# Patient Record
Sex: FEMALE | Race: WHITE
[De-identification: names, ages, dates, MRNs, and addresses within clinical notes are randomized per-mention and may not be internally consistent; named-entity substitution may affect disease eponyms.]

---

## 2017-05-01 ENCOUNTER — HOSPITAL ENCOUNTER (OUTPATIENT)
Dept: HOSPITAL 79 - ER1 | Age: 52
Setting detail: OBSERVATION
LOS: 2 days | Discharge: HOME | End: 2017-05-03
Attending: INTERNAL MEDICINE | Admitting: EMERGENCY MEDICINE
Payer: COMMERCIAL

## 2017-05-01 VITALS — BODY MASS INDEX: 31.7 KG/M2 | WEIGHT: 197.25 LBS | HEIGHT: 66 IN

## 2017-05-01 DIAGNOSIS — Z90.49: ICD-10-CM

## 2017-05-01 DIAGNOSIS — Z79.899: ICD-10-CM

## 2017-05-01 DIAGNOSIS — I49.5: ICD-10-CM

## 2017-05-01 DIAGNOSIS — J44.1: ICD-10-CM

## 2017-05-01 DIAGNOSIS — Z99.81: ICD-10-CM

## 2017-05-01 DIAGNOSIS — I48.0: ICD-10-CM

## 2017-05-01 DIAGNOSIS — Z88.0: ICD-10-CM

## 2017-05-01 DIAGNOSIS — Z95.0: ICD-10-CM

## 2017-05-01 DIAGNOSIS — R09.02: ICD-10-CM

## 2017-05-01 DIAGNOSIS — Z98.51: ICD-10-CM

## 2017-05-01 DIAGNOSIS — G40.909: Primary | ICD-10-CM

## 2017-05-01 DIAGNOSIS — I10: ICD-10-CM

## 2017-05-01 DIAGNOSIS — G47.33: ICD-10-CM

## 2017-05-01 DIAGNOSIS — F41.9: ICD-10-CM

## 2017-05-01 DIAGNOSIS — Z79.82: ICD-10-CM

## 2017-05-01 LAB
BUN/CREATININE RATIO: 12 (ref 0–10)
HGB BLD-MCNC: 11.4 GM/DL (ref 12.3–15.3)
RED BLOOD COUNT: 4.38 M/UL (ref 4–5.1)
WHITE BLOOD COUNT: 9.7 K/UL (ref 4.5–11)

## 2017-05-01 PROCEDURE — G0378 HOSPITAL OBSERVATION PER HR: HCPCS

## 2020-05-22 ENCOUNTER — OUTSIDE FACILITY SERVICE (OUTPATIENT)
Dept: CARDIOLOGY | Facility: CLINIC | Age: 55
End: 2020-05-22

## 2020-05-22 PROCEDURE — 93018 CV STRESS TEST I&R ONLY: CPT | Performed by: INTERNAL MEDICINE

## 2020-05-22 PROCEDURE — 78452 HT MUSCLE IMAGE SPECT MULT: CPT | Performed by: INTERNAL MEDICINE

## 2021-09-15 ENCOUNTER — HOSPITAL ENCOUNTER (EMERGENCY)
Facility: HOSPITAL | Age: 56
Discharge: HOME | End: 2021-09-15
Attending: STUDENT IN AN ORGANIZED HEALTH CARE EDUCATION/TRAINING PROGRAM | Admitting: STUDENT IN AN ORGANIZED HEALTH CARE EDUCATION/TRAINING PROGRAM

## 2021-09-15 VITALS
HEART RATE: 75 BPM | OXYGEN SATURATION: 98 % | RESPIRATION RATE: 16 BRPM | BODY MASS INDEX: 37.22 KG/M2 | SYSTOLIC BLOOD PRESSURE: 148 MMHG | TEMPERATURE: 98.7 F | HEIGHT: 64 IN | WEIGHT: 218 LBS | DIASTOLIC BLOOD PRESSURE: 76 MMHG

## 2021-09-15 DIAGNOSIS — F41.9 ANXIETY: Primary | ICD-10-CM

## 2021-09-15 LAB
ALBUMIN SERPL-MCNC: 4.38 G/DL (ref 3.5–5.2)
ALBUMIN/GLOB SERPL: 1.6 G/DL
ALP SERPL-CCNC: 126 U/L (ref 39–117)
ALT SERPL W P-5'-P-CCNC: 16 U/L (ref 1–33)
AMPHET+METHAMPHET UR QL: NEGATIVE
AMPHETAMINES UR QL: NEGATIVE
ANION GAP SERPL CALCULATED.3IONS-SCNC: 10.4 MMOL/L (ref 5–15)
AST SERPL-CCNC: 16 U/L (ref 1–32)
B-HCG UR QL: NEGATIVE
BACTERIA UR QL AUTO: ABNORMAL /HPF
BARBITURATES UR QL SCN: NEGATIVE
BASOPHILS # BLD AUTO: 0.02 10*3/MM3 (ref 0–0.2)
BASOPHILS NFR BLD AUTO: 0.2 % (ref 0–1.5)
BENZODIAZ UR QL SCN: NEGATIVE
BILIRUB SERPL-MCNC: 1.2 MG/DL (ref 0–1.2)
BILIRUB UR QL STRIP: ABNORMAL
BUN SERPL-MCNC: 12 MG/DL (ref 6–20)
BUN/CREAT SERPL: 15 (ref 7–25)
BUPRENORPHINE SERPL-MCNC: NEGATIVE NG/ML
CALCIUM SPEC-SCNC: 9.3 MG/DL (ref 8.6–10.5)
CANNABINOIDS SERPL QL: NEGATIVE
CHLORIDE SERPL-SCNC: 106 MMOL/L (ref 98–107)
CLARITY UR: ABNORMAL
CO2 SERPL-SCNC: 25.6 MMOL/L (ref 22–29)
COCAINE UR QL: NEGATIVE
COD CRY URNS QL: ABNORMAL /HPF
COLOR UR: ABNORMAL
CREAT SERPL-MCNC: 0.8 MG/DL (ref 0.57–1)
DEPRECATED RDW RBC AUTO: 38.1 FL (ref 37–54)
EOSINOPHIL # BLD AUTO: 0.09 10*3/MM3 (ref 0–0.4)
EOSINOPHIL NFR BLD AUTO: 1.1 % (ref 0.3–6.2)
ERYTHROCYTE [DISTWIDTH] IN BLOOD BY AUTOMATED COUNT: 12.7 % (ref 12.3–15.4)
ETHANOL BLD-MCNC: <10 MG/DL (ref 0–10)
ETHANOL UR QL: <0.01 %
FLUAV SUBTYP SPEC NAA+PROBE: NOT DETECTED
FLUBV RNA ISLT QL NAA+PROBE: NOT DETECTED
GFR SERPL CREATININE-BSD FRML MDRD: 74 ML/MIN/1.73
GLOBULIN UR ELPH-MCNC: 2.7 GM/DL
GLUCOSE SERPL-MCNC: 112 MG/DL (ref 65–99)
GLUCOSE UR STRIP-MCNC: NEGATIVE MG/DL
HCT VFR BLD AUTO: 41.1 % (ref 34–46.6)
HGB BLD-MCNC: 12.9 G/DL (ref 12–15.9)
HGB UR QL STRIP.AUTO: ABNORMAL
HYALINE CASTS UR QL AUTO: ABNORMAL /LPF
IMM GRANULOCYTES # BLD AUTO: 0.03 10*3/MM3 (ref 0–0.05)
IMM GRANULOCYTES NFR BLD AUTO: 0.4 % (ref 0–0.5)
KETONES UR QL STRIP: ABNORMAL
LEUKOCYTE ESTERASE UR QL STRIP.AUTO: ABNORMAL
LYMPHOCYTES # BLD AUTO: 1.5 10*3/MM3 (ref 0.7–3.1)
LYMPHOCYTES NFR BLD AUTO: 18.6 % (ref 19.6–45.3)
MAGNESIUM SERPL-MCNC: 2.3 MG/DL (ref 1.6–2.6)
MCH RBC QN AUTO: 26.1 PG (ref 26.6–33)
MCHC RBC AUTO-ENTMCNC: 31.4 G/DL (ref 31.5–35.7)
MCV RBC AUTO: 83.2 FL (ref 79–97)
METHADONE UR QL SCN: NEGATIVE
MONOCYTES # BLD AUTO: 0.47 10*3/MM3 (ref 0.1–0.9)
MONOCYTES NFR BLD AUTO: 5.8 % (ref 5–12)
NEUTROPHILS NFR BLD AUTO: 5.97 10*3/MM3 (ref 1.7–7)
NEUTROPHILS NFR BLD AUTO: 73.9 % (ref 42.7–76)
NITRITE UR QL STRIP: NEGATIVE
NRBC BLD AUTO-RTO: 0 /100 WBC (ref 0–0.2)
OPIATES UR QL: NEGATIVE
OXYCODONE UR QL SCN: NEGATIVE
PCP UR QL SCN: NEGATIVE
PH UR STRIP.AUTO: <=5 [PH] (ref 5–8)
PLATELET # BLD AUTO: 337 10*3/MM3 (ref 140–450)
PMV BLD AUTO: 10.4 FL (ref 6–12)
POTASSIUM SERPL-SCNC: 3.7 MMOL/L (ref 3.5–5.2)
PROPOXYPH UR QL: NEGATIVE
PROT SERPL-MCNC: 7.1 G/DL (ref 6–8.5)
PROT UR QL STRIP: ABNORMAL
RBC # BLD AUTO: 4.94 10*6/MM3 (ref 3.77–5.28)
RBC # UR: ABNORMAL /HPF
REF LAB TEST METHOD: ABNORMAL
SARS-COV-2 RNA PNL SPEC NAA+PROBE: NOT DETECTED
SODIUM SERPL-SCNC: 142 MMOL/L (ref 136–145)
SP GR UR STRIP: 1.03 (ref 1–1.03)
SQUAMOUS #/AREA URNS HPF: ABNORMAL /HPF
TRANS CELLS #/AREA URNS HPF: ABNORMAL /HPF
TRICYCLICS UR QL SCN: NEGATIVE
UROBILINOGEN UR QL STRIP: ABNORMAL
WBC # BLD AUTO: 8.08 10*3/MM3 (ref 3.4–10.8)
WBC UR QL AUTO: ABNORMAL /HPF

## 2021-09-15 PROCEDURE — 99285 EMERGENCY DEPT VISIT HI MDM: CPT

## 2021-09-15 PROCEDURE — 80306 DRUG TEST PRSMV INSTRMNT: CPT | Performed by: PHYSICIAN ASSISTANT

## 2021-09-15 PROCEDURE — 81001 URINALYSIS AUTO W/SCOPE: CPT | Performed by: PHYSICIAN ASSISTANT

## 2021-09-15 PROCEDURE — 80053 COMPREHEN METABOLIC PANEL: CPT | Performed by: PHYSICIAN ASSISTANT

## 2021-09-15 PROCEDURE — 87636 SARSCOV2 & INF A&B AMP PRB: CPT | Performed by: PHYSICIAN ASSISTANT

## 2021-09-15 PROCEDURE — C9803 HOPD COVID-19 SPEC COLLECT: HCPCS

## 2021-09-15 PROCEDURE — 82077 ASSAY SPEC XCP UR&BREATH IA: CPT | Performed by: PHYSICIAN ASSISTANT

## 2021-09-15 PROCEDURE — 81025 URINE PREGNANCY TEST: CPT | Performed by: PHYSICIAN ASSISTANT

## 2021-09-15 PROCEDURE — 83735 ASSAY OF MAGNESIUM: CPT | Performed by: PHYSICIAN ASSISTANT

## 2021-09-15 PROCEDURE — 85025 COMPLETE CBC W/AUTO DIFF WBC: CPT | Performed by: PHYSICIAN ASSISTANT

## 2021-09-15 RX ORDER — LORAZEPAM 1 MG/1
1 TABLET ORAL ONCE
Status: COMPLETED | OUTPATIENT
Start: 2021-09-15 | End: 2021-09-15

## 2021-09-15 RX ADMIN — LORAZEPAM 1 MG: 1 TABLET ORAL at 08:48

## 2021-09-15 NOTE — NURSING NOTE
Called and provided all intake information including  abnormal lab and medical information to  discharge orders received [id number].Patient and ED provider notified   Toxicology

## 2021-09-15 NOTE — DISCHARGE INSTRUCTIONS
Advised patient to follow up with PCP and psychiatrist as soon as possible. I advised patient to return to the ER if worsening or new symptoms present. Patient verbalized understanding of follow up.

## 2021-09-15 NOTE — NURSING NOTE
Patient presented to ED via friend on the behest of her therapist. Patient complains of feelings of overwhelming sadness and feelings of being on the edge. She reported she was caring her grand child for about 11 months while the mother went to work however recently the mother has sought other arrangements and this is upsetting to the patient.Patient denies SI, HI,drugs and AVH she stated she goes out occassionally with friends to a local bar and will have a few drinks. She denies any dependency related problems.She rated anxiety 10 (Through the roof) and depression 6/10.She reports poor sleep and appetite. Patient was Hydroxyzine 50 mg  Daily for sleep Mirtazapine 7.5 daily for depression .

## 2021-09-15 NOTE — ED PROVIDER NOTES
Subjective   Assumed patient from ALFONZO Fischer at shift change. Patient is a 56 year old female presenting to the ER c/o depression and anxiety that she has chronically but for the past few days she's reported increased stress from different family members has increased her anxiety and stress. Patient sees a therapist regularly and hasn't missed any appointments with her. Patient denies suicidal or homicidal ideation today. Patient states that she's just having a hard time coping with her stress and would like to speak to a physiatrist.        History provided by:  Patient  Mental Health Problem  Presenting symptoms: depression    Degree of incapacity (severity):  Severe  Onset quality:  Gradual  Timing:  Constant  Progression:  Worsening  Chronicity:  Recurrent  Context: stressful life event    Treatment compliance:  Most of the time  Relieved by:  Nothing  Associated symptoms: anxiety, feelings of worthlessness and irritability    Associated symptoms: no abdominal pain and no chest pain    Risk factors: hx of mental illness        Review of Systems   Constitutional: Positive for irritability. Negative for fever.   HENT: Negative.    Respiratory: Negative.    Cardiovascular: Negative.  Negative for chest pain.   Gastrointestinal: Negative.  Negative for abdominal pain.   Endocrine: Negative.    Genitourinary: Negative.  Negative for dysuria.   Skin: Negative.    Neurological: Negative.    Psychiatric/Behavioral: The patient is nervous/anxious.    All other systems reviewed and are negative.      Past Medical History:   Diagnosis Date   • A-fib (CMS/HCC)    • Anxiety    • COPD (chronic obstructive pulmonary disease) (CMS/HCC)    • Depression    • Hypertension        Allergies   Allergen Reactions   • Penicillins        Past Surgical History:   Procedure Laterality Date   • CHOLECYSTECTOMY     • PACEMAKER IMPLANTATION         Family History   Problem Relation Age of Onset   • Depression Father        Social  History     Socioeconomic History   • Marital status: Single     Spouse name: Not on file   • Number of children: Not on file   • Years of education: Not on file   • Highest education level: Not on file   Tobacco Use   • Smoking status: Never Smoker   Substance and Sexual Activity   • Alcohol use: Yes     Comment: OCC   • Drug use: No   • Sexual activity: Yes     Partners: Male           Objective   Physical Exam  Vitals and nursing note reviewed.   Constitutional:       General: She is not in acute distress.     Appearance: She is well-developed. She is not diaphoretic.   HENT:      Head: Normocephalic and atraumatic.      Right Ear: External ear normal.      Left Ear: External ear normal.      Nose: Nose normal.   Eyes:      Conjunctiva/sclera: Conjunctivae normal.   Neck:      Vascular: No JVD.      Trachea: No tracheal deviation.   Cardiovascular:      Rate and Rhythm: Normal rate.      Heart sounds: No murmur heard.     Pulmonary:      Effort: Pulmonary effort is normal. No respiratory distress.      Breath sounds: No wheezing.   Abdominal:      Palpations: Abdomen is soft.      Tenderness: There is no abdominal tenderness.   Musculoskeletal:         General: No deformity. Normal range of motion.      Cervical back: Normal range of motion and neck supple.   Skin:     General: Skin is warm and dry.      Coloration: Skin is not pale.      Findings: No erythema or rash.   Neurological:      Mental Status: She is alert and oriented to person, place, and time.      Cranial Nerves: No cranial nerve deficit.   Psychiatric:      Comments: Patient is sobbing.  Patient states she has had just increased family stress which is led to her depression.  Patient verbalized she does have a history of cutting.         Procedures        Results for orders placed or performed during the hospital encounter of 09/15/21   COVID-19 and FLU A/B PCR - Swab, Nasopharynx    Specimen: Nasopharynx; Swab   Result Value Ref Range    COVID19  Not Detected Not Detected - Ref. Range    Influenza A PCR Not Detected Not Detected    Influenza B PCR Not Detected Not Detected   Comprehensive Metabolic Panel    Specimen: Arm, Left; Blood   Result Value Ref Range    Glucose 112 (H) 65 - 99 mg/dL    BUN 12 6 - 20 mg/dL    Creatinine 0.80 0.57 - 1.00 mg/dL    Sodium 142 136 - 145 mmol/L    Potassium 3.7 3.5 - 5.2 mmol/L    Chloride 106 98 - 107 mmol/L    CO2 25.6 22.0 - 29.0 mmol/L    Calcium 9.3 8.6 - 10.5 mg/dL    Total Protein 7.1 6.0 - 8.5 g/dL    Albumin 4.38 3.50 - 5.20 g/dL    ALT (SGPT) 16 1 - 33 U/L    AST (SGOT) 16 1 - 32 U/L    Alkaline Phosphatase 126 (H) 39 - 117 U/L    Total Bilirubin 1.2 0.0 - 1.2 mg/dL    eGFR Non African Amer 74 >60 mL/min/1.73    Globulin 2.7 gm/dL    A/G Ratio 1.6 g/dL    BUN/Creatinine Ratio 15.0 7.0 - 25.0    Anion Gap 10.4 5.0 - 15.0 mmol/L   Urinalysis With Microscopic If Indicated (No Culture) - Urine, Clean Catch    Specimen: Urine, Clean Catch   Result Value Ref Range    Color, UA Dark Yellow (A) Yellow, Straw    Appearance, UA Turbid (A) Clear    pH, UA <=5.0 5.0 - 8.0    Specific Gravity, UA 1.029 1.005 - 1.030    Glucose, UA Negative Negative    Ketones, UA Trace (A) Negative    Bilirubin, UA Small (1+) (A) Negative    Blood, UA Moderate (2+) (A) Negative    Protein, UA 30 mg/dL (1+) (A) Negative    Leuk Esterase, UA Moderate (2+) (A) Negative    Nitrite, UA Negative Negative    Urobilinogen, UA 1.0 E.U./dL 0.2 - 1.0 E.U./dL   Urine Drug Screen - Urine, Clean Catch    Specimen: Urine, Clean Catch   Result Value Ref Range    THC, Screen, Urine Negative Negative    Phencyclidine (PCP), Urine Negative Negative    Cocaine Screen, Urine Negative Negative    Methamphetamine, Ur Negative Negative    Opiate Screen Negative Negative    Amphetamine Screen, Urine Negative Negative    Benzodiazepine Screen, Urine Negative Negative    Tricyclic Antidepressants Screen Negative Negative    Methadone Screen, Urine Negative Negative     Barbiturates Screen, Urine Negative Negative    Oxycodone Screen, Urine Negative Negative    Propoxyphene Screen Negative Negative    Buprenorphine, Screen, Urine Negative Negative   Magnesium    Specimen: Arm, Left; Blood   Result Value Ref Range    Magnesium 2.3 1.6 - 2.6 mg/dL   Ethanol    Specimen: Arm, Left; Blood   Result Value Ref Range    Ethanol <10 0 - 10 mg/dL    Ethanol % <0.010 %   Pregnancy, Urine - Urine, Clean Catch    Specimen: Urine, Clean Catch   Result Value Ref Range    HCG, Urine QL Negative Negative   CBC Auto Differential    Specimen: Arm, Left; Blood   Result Value Ref Range    WBC 8.08 3.40 - 10.80 10*3/mm3    RBC 4.94 3.77 - 5.28 10*6/mm3    Hemoglobin 12.9 12.0 - 15.9 g/dL    Hematocrit 41.1 34.0 - 46.6 %    MCV 83.2 79.0 - 97.0 fL    MCH 26.1 (L) 26.6 - 33.0 pg    MCHC 31.4 (L) 31.5 - 35.7 g/dL    RDW 12.7 12.3 - 15.4 %    RDW-SD 38.1 37.0 - 54.0 fl    MPV 10.4 6.0 - 12.0 fL    Platelets 337 140 - 450 10*3/mm3    Neutrophil % 73.9 42.7 - 76.0 %    Lymphocyte % 18.6 (L) 19.6 - 45.3 %    Monocyte % 5.8 5.0 - 12.0 %    Eosinophil % 1.1 0.3 - 6.2 %    Basophil % 0.2 0.0 - 1.5 %    Immature Grans % 0.4 0.0 - 0.5 %    Neutrophils, Absolute 5.97 1.70 - 7.00 10*3/mm3    Lymphocytes, Absolute 1.50 0.70 - 3.10 10*3/mm3    Monocytes, Absolute 0.47 0.10 - 0.90 10*3/mm3    Eosinophils, Absolute 0.09 0.00 - 0.40 10*3/mm3    Basophils, Absolute 0.02 0.00 - 0.20 10*3/mm3    Immature Grans, Absolute 0.03 0.00 - 0.05 10*3/mm3    nRBC 0.0 0.0 - 0.2 /100 WBC   Urinalysis, Microscopic Only - Urine, Clean Catch    Specimen: Urine, Clean Catch   Result Value Ref Range    RBC, UA 21-30 (A) None Seen, 0-2 /HPF    WBC, UA Too Numerous to Count (A) None Seen, 0-2 /HPF    Bacteria, UA 4+ (A) None Seen /HPF    Squamous Epithelial Cells, UA Too Numerous to Count (A) None Seen, 0-2 /HPF    Transitional Epithelial Cells, UA 3-6 (A) 0 - 2 /HPF    Hyaline Casts, UA 7-12 None Seen /LPF    Calcium Oxalate Crystals, UA  Small/1+ None Seen /HPF    Methodology Manual Light Microscopy      No orders to display         ED Course                                           MDM    Final diagnoses:   Anxiety       ED Disposition  ED Disposition     ED Disposition Condition Comment    Discharge Stable           Caden Worthy, APRN  [address] [facility] [address]  [facility] [id number] [phone]    Schedule an appointment as soon as possible for a visit in 1 day           Medication List      No changes were made to your prescriptions during this visit.          Radha Lr, APRN  09/15/21 0523

## 2021-09-15 NOTE — NURSING NOTE
Patient pockets emptied. Search completed with two staff members present.The patient was placed in hospital attire. Items logged and placed in cabinet in intake area.Room was swept for any potential safety hazards room cleared and patient placed in treatment room for evaluation. Will continue to monitor pt status. Waiting for ED provider to clear pt medically. Waiting on Lab results.    Anh GARCIA Lead

## 2023-09-18 ENCOUNTER — OFFICE VISIT (OUTPATIENT)
Dept: CARDIOLOGY | Facility: CLINIC | Age: 58
End: 2023-09-18
Payer: COMMERCIAL

## 2023-09-18 VITALS
WEIGHT: 227.4 LBS | DIASTOLIC BLOOD PRESSURE: 71 MMHG | BODY MASS INDEX: 41.85 KG/M2 | SYSTOLIC BLOOD PRESSURE: 103 MMHG | OXYGEN SATURATION: 97 % | HEIGHT: 62 IN | HEART RATE: 62 BPM

## 2023-09-18 DIAGNOSIS — R00.2 PALPITATIONS: ICD-10-CM

## 2023-09-18 DIAGNOSIS — R06.02 SHORTNESS OF BREATH: ICD-10-CM

## 2023-09-18 DIAGNOSIS — I48.0 PAROXYSMAL ATRIAL FIBRILLATION: ICD-10-CM

## 2023-09-18 DIAGNOSIS — R07.2 PRECORDIAL PAIN: Primary | ICD-10-CM

## 2023-09-18 DIAGNOSIS — I10 PRIMARY HYPERTENSION: ICD-10-CM

## 2023-09-18 DIAGNOSIS — R55 SYNCOPE AND COLLAPSE: ICD-10-CM

## 2023-09-18 PROCEDURE — 1159F MED LIST DOCD IN RCRD: CPT | Performed by: NURSE PRACTITIONER

## 2023-09-18 PROCEDURE — 93000 ELECTROCARDIOGRAM COMPLETE: CPT | Performed by: NURSE PRACTITIONER

## 2023-09-18 PROCEDURE — 99204 OFFICE O/P NEW MOD 45 MIN: CPT | Performed by: NURSE PRACTITIONER

## 2023-09-18 PROCEDURE — 1160F RVW MEDS BY RX/DR IN RCRD: CPT | Performed by: NURSE PRACTITIONER

## 2023-09-18 RX ORDER — AMLODIPINE BESYLATE 2.5 MG/1
2.5 TABLET ORAL DAILY
COMMUNITY

## 2023-09-18 RX ORDER — POTASSIUM CHLORIDE 750 MG/1
10 TABLET, FILM COATED, EXTENDED RELEASE ORAL DAILY
COMMUNITY

## 2023-09-18 RX ORDER — FLUTICASONE FUROATE AND VILANTEROL 200; 25 UG/1; UG/1
1 POWDER RESPIRATORY (INHALATION)
COMMUNITY

## 2023-09-18 RX ORDER — BUPROPION HYDROCHLORIDE 150 MG/1
150 TABLET ORAL EVERY MORNING
COMMUNITY

## 2023-09-18 RX ORDER — SERTRALINE HYDROCHLORIDE 100 MG/1
100 TABLET, FILM COATED ORAL DAILY
COMMUNITY

## 2023-09-18 RX ORDER — ISOSORBIDE DINITRATE 10 MG/1
10 TABLET ORAL 2 TIMES DAILY
COMMUNITY

## 2023-09-18 RX ORDER — ALBUTEROL SULFATE 90 UG/1
2 AEROSOL, METERED RESPIRATORY (INHALATION) EVERY 4 HOURS PRN
COMMUNITY

## 2023-09-18 RX ORDER — SERTRALINE HYDROCHLORIDE 25 MG/1
25 TABLET, FILM COATED ORAL DAILY
COMMUNITY

## 2023-09-18 RX ORDER — MULTIPLE VITAMINS W/ MINERALS TAB 9MG-400MCG
1 TAB ORAL DAILY
COMMUNITY

## 2023-09-18 RX ORDER — FLUTICASONE PROPIONATE 50 MCG
2 SPRAY, SUSPENSION (ML) NASAL DAILY
COMMUNITY

## 2023-09-18 RX ORDER — TRAZODONE HYDROCHLORIDE 100 MG/1
100 TABLET ORAL NIGHTLY
COMMUNITY

## 2023-09-18 RX ORDER — ECHINACEA PURPUREA EXTRACT 125 MG
1 TABLET ORAL AS NEEDED
COMMUNITY

## 2023-09-18 NOTE — PROGRESS NOTES
Subjective     Cris Canada is a 58 y.o. female who presents to day for Chest Pain (Left arm pain and shoulder pain), Shortness of Breath, Nausea, Numbness (Lips and tounge), Aortic Aneurysm, and Fatigue.    CHIEF COMPLIANT  Chief Complaint   Patient presents with    Chest Pain     Left arm pain and shoulder pain    Shortness of Breath    Nausea    Numbness     Lips and tounge    Aortic Aneurysm    Fatigue       Active Problems:  Problem List Items Addressed This Visit    None  Visit Diagnoses       Precordial pain    -  Primary    Relevant Orders    ECG 12 Lead    Stress Test With Myocardial Perfusion One Day    Adult Transthoracic Echo Complete W/ Cont if Necessary Per Protocol    Duplex Carotid Ultrasound CAR    Paroxysmal atrial fibrillation        Relevant Medications    amLODIPine (NORVASC) 2.5 MG tablet    isosorbide dinitrate (ISORDIL) 10 MG tablet    Other Relevant Orders    ECG 12 Lead    Stress Test With Myocardial Perfusion One Day    Adult Transthoracic Echo Complete W/ Cont if Necessary Per Protocol    Duplex Carotid Ultrasound CAR    Shortness of breath        Relevant Orders    ECG 12 Lead    Stress Test With Myocardial Perfusion One Day    Adult Transthoracic Echo Complete W/ Cont if Necessary Per Protocol    Duplex Carotid Ultrasound CAR    Primary hypertension        Relevant Medications    amLODIPine (NORVASC) 2.5 MG tablet    Other Relevant Orders    ECG 12 Lead    Stress Test With Myocardial Perfusion One Day    Adult Transthoracic Echo Complete W/ Cont if Necessary Per Protocol    Duplex Carotid Ultrasound CAR    Syncope and collapse        Relevant Orders    ECG 12 Lead    Stress Test With Myocardial Perfusion One Day    Adult Transthoracic Echo Complete W/ Cont if Necessary Per Protocol    Duplex Carotid Ultrasound CAR    Palpitations        Relevant Orders    ECG 12 Lead    Stress Test With Myocardial Perfusion One Day    Adult Transthoracic Echo Complete W/ Cont if Necessary Per  "Protocol    Duplex Carotid Ultrasound CAR            HPI  HPI    Cris Canada is a 58-year-old female being seen today to establish care for cardiac evaluation of chest pain, atrial fibrillation, and an aortic aneurysm. The patient is accompanied by an adult female.    The adult female states the patient has a medical history of an aneurysm. She states the patient's aneurysm was 3.5 cm approximately 4 to 5 months ago. She adds that the patient has a history of aneurysm, lung disease, COPD and liver disease. She has a pacemaker.    The patient has been experiencing chest pain that radiates into her left arm, left shoulder, and into her back for a while. The adult female states the patient had the classic signs of a myocardial infarction on Wednesday, 09/13/2022. She states the patient was unable to walk across the floor. The adult female states the patient has had residual pain in her chest area since then. The patient states she was sitting, talking on the phone when the pain started. She describes her eyes were \"so black.\" The adult female states she was unsure if the patient would make it to the car. She required assistance walking from the porch to the car.  I she adds that activity occasionally makes the pain worse.    The adult female states the patient's shortness of breath is constant and is in conjunction with her COPD. Pulmonary embolism was ruled out while at the hospital. The patient states she is constantly fatigued. She states she can sleep for 8 to 9 hours and still wake up tired.    Approximately 3 weeks ago, during a car ride, the patient started feeling nauseous and the adult female who was driving, pulled over. The patient did not vomit. The adult female states they got back on the road and the patient passed out. The patient did not recall being awake. The adult female states it took 15 minutes to wake her up and the patient's sleep apnea kicked in. The adult female states she had to get the " "patient to use her inhaler. The patient did not lose bowel or bladder control when this happened. The adult female states the patient did not experience any seizure like activity. The adult female states the patient was gasping for air and describes she sounded like a vacuum . The adult female states she is unsure if it was the patient's sleep apnea, her heart, or her COPD causing the episode. The adult female states the patient sharon when she lays flat. Sleeps in a recliner predominantly. She has a history of sleep apnea and wakes up short of breath.    The adult female states the patient experiences \"skipping\" palpitations daily. She adds the patient will flinch and touch her chest if her pacemaker is doing its job. The patient was due for a device around the time she transferred to [facility]. The adult female states she is wondering if it is malfunctioning. The pacemaker is manufactured by [employer]. The patient's pacemaker was being monitored by Dr. Villalobos. She has had it since 2015.    The patient states her symptoms have been stable over the last couple of weeks. The adult female states it has been approximately 1 month since the patient had chest pain.     She states she does not know how long she has had atrial fibrillation. She denies bleeding while taking Eliquis.    She reports lower extremity edema.    The patient states she has a family history of myocardial infarction in her father, heart disease in her brother, and heart failure in her sister. She states her brother had an aneurysm.    PRIOR MEDS  Current Outpatient Medications on File Prior to Visit   Medication Sig Dispense Refill    ALBUTEROL IN Inhale 2.5 mg As Needed. Neb soultion      albuterol sulfate  (90 Base) MCG/ACT inhaler Inhale 2 puffs Every 4 (Four) Hours As Needed for Wheezing.      amLODIPine (NORVASC) 2.5 MG tablet Take 1 tablet by mouth Daily.      apixaban (ELIQUIS) 5 MG tablet tablet Take 1 tablet by mouth 2 " (Two) Times a Day.      buPROPion XL (WELLBUTRIN XL) 150 MG 24 hr tablet Take 1 tablet by mouth Every Morning.      Calcium Carb-Cholecalciferol (CALCIUM 500 + D3 PO) Take  by mouth. 500-400 1tab po qd      fluticasone (FLONASE) 50 MCG/ACT nasal spray 2 sprays into the nostril(s) as directed by provider Daily.      Fluticasone Furoate-Vilanterol (Breo Ellipta) 200-25 MCG/ACT inhaler Inhale 1 puff Daily.      furosemide (LASIX) 40 MG tablet Take 1 tablet by mouth 2 (Two) Times a Day.      isosorbide dinitrate (ISORDIL) 10 MG tablet Take 1 tablet by mouth 2 (Two) Times a Day.      metoprolol tartrate (LOPRESSOR) 25 MG tablet Take 1 tablet by mouth 2 (Two) Times a Day.      montelukast (SINGULAIR) 10 MG tablet Take 1 tablet by mouth Every Night.      multivitamin with minerals tablet tablet Take 1 tablet by mouth Daily.      pantoprazole (PROTONIX) 40 MG EC tablet Take 1 tablet by mouth Daily.      potassium chloride 10 MEQ CR tablet Take 1 tablet by mouth Daily.      sertraline (ZOLOFT) 100 MG tablet Take 1 tablet by mouth Daily.      sertraline (ZOLOFT) 25 MG tablet Take 1 tablet by mouth Daily.      sodium chloride 0.65 % nasal spray 1 spray into the nostril(s) as directed by provider As Needed for Congestion.      traZODone (DESYREL) 100 MG tablet Take 1 tablet by mouth Every Night.      vitamin D3 125 MCG (5000 UT) capsule capsule Take 1 capsule by mouth Daily.       No current facility-administered medications on file prior to visit.       ALLERGIES  Penicillins    HISTORY  Past Medical History:   Diagnosis Date    A-fib     Anxiety     Aortic aneurysm     COPD (chronic obstructive pulmonary disease)     Alpha Tripsen I    Depression     Hypertension     PTSD (post-traumatic stress disorder)        Social History     Socioeconomic History    Marital status: Single   Tobacco Use    Smoking status: Never   Vaping Use    Vaping Use: Never used   Substance and Sexual Activity    Alcohol use: Not Currently      "Comment: OCC    Drug use: No    Sexual activity: Defer     Partners: Male       Family History   Problem Relation Age of Onset    Cancer Mother     Heart attack Father     Heart disease Father     Depression Father     Heart failure Sister     Leukemia Sister     Heart disease Brother        Review of Systems   Constitutional:  Positive for fatigue. Negative for chills and fever.   HENT:  Positive for congestion (coughing) and sore throat. Negative for rhinorrhea.    Eyes:  Negative for visual disturbance.   Respiratory:  Positive for apnea (By PAP), chest tightness and shortness of breath.    Cardiovascular:  Positive for chest pain (Pain goes into Left arm and shoulder also has pain in back  it is sharp in nature), palpitations (skipping) and leg swelling (Swelling in both ankles and feet).   Gastrointestinal:  Positive for diarrhea. Negative for constipation and nausea.   Musculoskeletal:  Positive for back pain and neck pain. Negative for arthralgias.   Allergic/Immunologic: Positive for environmental allergies. Negative for food allergies (pineapple mushrooms fish).   Neurological:  Positive for dizziness (random), syncope (3 weeks ago), weakness and light-headedness.   Hematological:  Bruises/bleeds easily.   Psychiatric/Behavioral:  Positive for sleep disturbance (SOB sharon sleeping in recliner).        Objective     VITALS: /71 (BP Location: Left arm, Patient Position: Sitting, Cuff Size: Adult)   Pulse 62   Ht 157.5 cm (62\")   Wt 103 kg (227 lb 6.4 oz)   SpO2 97%   BMI 41.59 kg/m²     LABS:   Lab Results (most recent)       None            IMAGING:   No Images in the past 120 days found..    EXAM:  Physical Exam  Vitals and nursing note reviewed.   Constitutional:       Appearance: She is well-developed.   HENT:      Head: Normocephalic.   Neck:      Thyroid: No thyroid mass.      Vascular: No carotid bruit or JVD.      Trachea: Trachea and phonation normal.   Cardiovascular:      Rate and " Rhythm: Normal rate and regular rhythm.      Pulses:           Radial pulses are 2+ on the right side and 2+ on the left side.        Posterior tibial pulses are 2+ on the right side and 2+ on the left side.      Heart sounds: Normal heart sounds. No murmur heard.     No friction rub. No gallop.   Pulmonary:      Effort: Pulmonary effort is normal. No respiratory distress.      Breath sounds: Normal breath sounds. No wheezing or rales.   Musculoskeletal:         General: Swelling (trace) present. Normal range of motion.      Cervical back: Neck supple.   Skin:     General: Skin is warm and dry.      Capillary Refill: Capillary refill takes less than 2 seconds.      Findings: No rash.   Neurological:      Mental Status: She is alert and oriented to person, place, and time.   Psychiatric:         Speech: Speech normal.         Behavior: Behavior normal.         Thought Content: Thought content normal.         Judgment: Judgment normal.         Procedure     ECG 12 Lead    Date/Time: 9/18/2023 12:33 PM  Performed by: Adolfo Dumont APRN    Authorized by: Adolfo Dumont APRN  Previous ECG: no previous ECG available  Rhythm: sinus bradycardia  Rate: bradycardic  BPM: 54  QRS axis: normal  Other findings: non-specific ST-T wave changes  Comments: QTc 383 ms  No acute changes             Assessment & Plan    Diagnosis Plan   1. Precordial pain  ECG 12 Lead    Stress Test With Myocardial Perfusion One Day    Adult Transthoracic Echo Complete W/ Cont if Necessary Per Protocol    Duplex Carotid Ultrasound CAR      2. Paroxysmal atrial fibrillation  ECG 12 Lead    Stress Test With Myocardial Perfusion One Day    Adult Transthoracic Echo Complete W/ Cont if Necessary Per Protocol    Duplex Carotid Ultrasound CAR      3. Shortness of breath  ECG 12 Lead    Stress Test With Myocardial Perfusion One Day    Adult Transthoracic Echo Complete W/ Cont if Necessary Per Protocol    Duplex Carotid Ultrasound CAR      4. Primary  hypertension  ECG 12 Lead    Stress Test With Myocardial Perfusion One Day    Adult Transthoracic Echo Complete W/ Cont if Necessary Per Protocol    Duplex Carotid Ultrasound CAR      5. Syncope and collapse  ECG 12 Lead    Stress Test With Myocardial Perfusion One Day    Adult Transthoracic Echo Complete W/ Cont if Necessary Per Protocol    Duplex Carotid Ultrasound CAR      6. Palpitations  ECG 12 Lead    Stress Test With Myocardial Perfusion One Day    Adult Transthoracic Echo Complete W/ Cont if Necessary Per Protocol    Duplex Carotid Ultrasound CAR      1. The patient was ordered to have a stress test, echocardiogram for cardiac  evaluation of ischemia and potential causes of her symptoms including chest pain, shortness of breath, and syncope.  2. The patient will undergo a pacemaker check for evaluation of potential arrhythmias that could be the cause of her symptoms.  3.  Duplex carotid ultrasound to rule out carotid artery disease as a potential cause of her syncope.  4.  Patient's blood pressure is controlled on current blood pressure medication regimen.  No medication changes are warranted at this time.  Patient advised to monitor blood pressure on a daily basis and report any persistent highs or lows.  Set goal blood pressure for patient at 130/80 or below.  5.  Patient reports that she has an ascending aortic aneurysm that in the last 6 months has been 3 5-3.9 for evaluation this is unavailable to us at this time.  These records are at Dr. Barnes's office.  We will request for further evaluation.  6.  Informed of signs and symptoms of ACS and advised to seek emergent treatment for any new worsening symptoms.  Patient also advised sooner follow-up as needed.  Also advised to follow-up with family doctor as needed  This note is dictated utilizing voice recognition software.  Although this record has been proof read, transcriptional errors may still be present. If questions occur regarding the content of  this record please do not hesitate to call our office.  I have reviewed and confirmed the accuracy of the ROS as documented by the MA/LPN/RN DURAN Martin  Assessment  1. Chest pain  2. Shortness of breath  3. Palpitations  4. Aortic aneurysm  5. Fatigue    Return if symptoms worsen or fail to improve, for Next scheduled follow up.    Diagnoses and all orders for this visit:    1. Precordial pain (Primary)  -     ECG 12 Lead  -     Stress Test With Myocardial Perfusion One Day; Future  -     Adult Transthoracic Echo Complete W/ Cont if Necessary Per Protocol; Future  -     Duplex Carotid Ultrasound CAR; Future    2. Paroxysmal atrial fibrillation  -     ECG 12 Lead  -     Stress Test With Myocardial Perfusion One Day; Future  -     Adult Transthoracic Echo Complete W/ Cont if Necessary Per Protocol; Future  -     Duplex Carotid Ultrasound CAR; Future    3. Shortness of breath  -     ECG 12 Lead  -     Stress Test With Myocardial Perfusion One Day; Future  -     Adult Transthoracic Echo Complete W/ Cont if Necessary Per Protocol; Future  -     Duplex Carotid Ultrasound CAR; Future    4. Primary hypertension  -     ECG 12 Lead  -     Stress Test With Myocardial Perfusion One Day; Future  -     Adult Transthoracic Echo Complete W/ Cont if Necessary Per Protocol; Future  -     Duplex Carotid Ultrasound CAR; Future    5. Syncope and collapse  -     ECG 12 Lead  -     Stress Test With Myocardial Perfusion One Day; Future  -     Adult Transthoracic Echo Complete W/ Cont if Necessary Per Protocol; Future  -     Duplex Carotid Ultrasound CAR; Future    6. Palpitations  -     ECG 12 Lead  -     Stress Test With Myocardial Perfusion One Day; Future  -     Adult Transthoracic Echo Complete W/ Cont if Necessary Per Protocol; Future  -     Duplex Carotid Ultrasound CAR; Future        Cris Daveols  reports that she has never smoked. She does not have any smokeless tobacco history on file..            MEDS ORDERED DURING  VISIT:  No orders of the defined types were placed in this encounter.          This document has been electronically signed by DURAN Martin Jr.  October 8, 2023 08:56 EDT  Transcribed from ambient dictation for DURAN Martin by Quiana Palma.  09/18/23   16:10 EDT    Patient or patient representative verbalized consent to the visit recording.  I have personally performed the services described in this document as transcribed by the above individual, and it is both accurate and complete.

## 2023-10-24 ENCOUNTER — HOSPITAL ENCOUNTER (OUTPATIENT)
Dept: CARDIOLOGY | Facility: HOSPITAL | Age: 58
Discharge: HOME | End: 2023-10-24
Payer: COMMERCIAL

## 2023-10-24 DIAGNOSIS — R55 SYNCOPE AND COLLAPSE: ICD-10-CM

## 2023-10-24 DIAGNOSIS — R06.02 SHORTNESS OF BREATH: ICD-10-CM

## 2023-10-24 DIAGNOSIS — R07.2 PRECORDIAL PAIN: ICD-10-CM

## 2023-10-24 DIAGNOSIS — I48.0 PAROXYSMAL ATRIAL FIBRILLATION: ICD-10-CM

## 2023-10-24 DIAGNOSIS — R00.2 PALPITATIONS: ICD-10-CM

## 2023-10-24 DIAGNOSIS — I10 PRIMARY HYPERTENSION: ICD-10-CM

## 2023-10-24 LAB
BH CV ECHO MEAS - ACS: 1.84 CM
BH CV ECHO MEAS - AO MAX PG: 7.1 MMHG
BH CV ECHO MEAS - AO MEAN PG: 4 MMHG
BH CV ECHO MEAS - AO ROOT DIAM: 3 CM
BH CV ECHO MEAS - AO V2 MAX: 133 CM/SEC
BH CV ECHO MEAS - AO V2 VTI: 31.6 CM
BH CV ECHO MEAS - EDV(CUBED): 93 ML
BH CV ECHO MEAS - EDV(MOD-SP4): 78.6 ML
BH CV ECHO MEAS - EF(MOD-SP4): 52.2 %
BH CV ECHO MEAS - EF_3D-VOL: 56 %
BH CV ECHO MEAS - ESV(CUBED): 24.4 ML
BH CV ECHO MEAS - ESV(MOD-SP4): 37.6 ML
BH CV ECHO MEAS - FS: 36 %
BH CV ECHO MEAS - IVS/LVPW: 1.04 CM
BH CV ECHO MEAS - IVSD: 1.42 CM
BH CV ECHO MEAS - LA DIMENSION: 3.9 CM
BH CV ECHO MEAS - LAT PEAK E' VEL: 7.6 CM/SEC
BH CV ECHO MEAS - LV DIASTOLIC VOL/BSA (35-75): 39 CM2
BH CV ECHO MEAS - LV MASS(C)D: 248.3 GRAMS
BH CV ECHO MEAS - LV SYSTOLIC VOL/BSA (12-30): 18.6 CM2
BH CV ECHO MEAS - LVIDD: 4.5 CM
BH CV ECHO MEAS - LVIDS: 2.9 CM
BH CV ECHO MEAS - LVPWD: 1.36 CM
BH CV ECHO MEAS - MED PEAK E' VEL: 5.1 CM/SEC
BH CV ECHO MEAS - MV A MAX VEL: 95.1 CM/SEC
BH CV ECHO MEAS - MV DEC TIME: 0.19 SEC
BH CV ECHO MEAS - MV E MAX VEL: 101 CM/SEC
BH CV ECHO MEAS - MV E/A: 1.06
BH CV ECHO MEAS - RAP SYSTOLE: 10 MMHG
BH CV ECHO MEAS - RVSP: 36.6 MMHG
BH CV ECHO MEAS - SI(MOD-SP4): 20.3 ML/M2
BH CV ECHO MEAS - SV(MOD-SP4): 41 ML
BH CV ECHO MEAS - TR MAX PG: 26.6 MMHG
BH CV ECHO MEAS - TR MAX VEL: 257.9 CM/SEC
BH CV ECHO MEASUREMENTS AVERAGE E/E' RATIO: 15.91
BH CV XLRA - RV BASE: 3.5 CM
BH CV XLRA - RV LENGTH: 7.6 CM
BH CV XLRA - RV MID: 2.5 CM
BH CV XLRA MEAS LEFT DIST CCA EDV: -27.7 CM/SEC
BH CV XLRA MEAS LEFT DIST CCA PSV: -81.4 CM/SEC
BH CV XLRA MEAS LEFT DIST ICA EDV: -36.4 CM/SEC
BH CV XLRA MEAS LEFT DIST ICA PSV: -95.3 CM/SEC
BH CV XLRA MEAS LEFT ICA/CCA RATIO: 1.6
BH CV XLRA MEAS LEFT MID ICA EDV: -45 CM/SEC
BH CV XLRA MEAS LEFT MID ICA PSV: -127 CM/SEC
BH CV XLRA MEAS LEFT PROX CCA EDV: 26.9 CM/SEC
BH CV XLRA MEAS LEFT PROX CCA PSV: 91 CM/SEC
BH CV XLRA MEAS LEFT PROX ECA PSV: -109 CM/SEC
BH CV XLRA MEAS LEFT PROX ICA EDV: -14.7 CM/SEC
BH CV XLRA MEAS LEFT PROX ICA PSV: -69.3 CM/SEC
BH CV XLRA MEAS LEFT VERTEBRAL A EDV: 14.3 CM/SEC
BH CV XLRA MEAS LEFT VERTEBRAL A PSV: 47.7 CM/SEC
BH CV XLRA MEAS RIGHT DIST CCA EDV: 27.3 CM/SEC
BH CV XLRA MEAS RIGHT DIST CCA PSV: 77.7 CM/SEC
BH CV XLRA MEAS RIGHT DIST ICA EDV: -39.1 CM/SEC
BH CV XLRA MEAS RIGHT DIST ICA PSV: -93.6 CM/SEC
BH CV XLRA MEAS RIGHT ICA/CCA RATIO: 1.5
BH CV XLRA MEAS RIGHT MID ICA EDV: -40.6 CM/SEC
BH CV XLRA MEAS RIGHT MID ICA PSV: -115 CM/SEC
BH CV XLRA MEAS RIGHT PROX CCA EDV: 13 CM/SEC
BH CV XLRA MEAS RIGHT PROX CCA PSV: 70.2 CM/SEC
BH CV XLRA MEAS RIGHT PROX ECA PSV: -118 CM/SEC
BH CV XLRA MEAS RIGHT PROX ICA EDV: -23.6 CM/SEC
BH CV XLRA MEAS RIGHT PROX ICA PSV: -72.7 CM/SEC
BH CV XLRA MEAS RIGHT VERTEBRAL A EDV: 16.5 CM/SEC
BH CV XLRA MEAS RIGHT VERTEBRAL A PSV: 35.5 CM/SEC
LEFT ATRIUM VOLUME INDEX: 26.8 ML/M2

## 2023-10-24 PROCEDURE — 93880 EXTRACRANIAL BILAT STUDY: CPT

## 2023-10-24 PROCEDURE — 93017 CV STRESS TEST TRACING ONLY: CPT

## 2023-10-24 PROCEDURE — 0 TECHNETIUM SESTAMIBI: Performed by: INTERNAL MEDICINE

## 2023-10-24 PROCEDURE — 93306 TTE W/DOPPLER COMPLETE: CPT

## 2023-10-24 PROCEDURE — 25010000002 REGADENOSON 0.4 MG/5ML SOLUTION: Performed by: INTERNAL MEDICINE

## 2023-10-24 PROCEDURE — A9500 TC99M SESTAMIBI: HCPCS | Performed by: INTERNAL MEDICINE

## 2023-10-24 PROCEDURE — 78452 HT MUSCLE IMAGE SPECT MULT: CPT

## 2023-10-24 RX ORDER — REGADENOSON 0.08 MG/ML
0.4 INJECTION, SOLUTION INTRAVENOUS
Status: COMPLETED | OUTPATIENT
Start: 2023-10-24 | End: 2023-10-24

## 2023-10-24 RX ADMIN — TECHNETIUM TC 99M SESTAMIBI 1 DOSE: 1 INJECTION INTRAVENOUS at 11:36

## 2023-10-24 RX ADMIN — TECHNETIUM TC 99M SESTAMIBI 1 DOSE: 1 INJECTION INTRAVENOUS at 09:38

## 2023-10-24 RX ADMIN — REGADENOSON 0.4 MG: 0.08 INJECTION, SOLUTION INTRAVENOUS at 11:36

## 2023-10-25 LAB
BH CV REST NUCLEAR ISOTOPE DOSE: 10 MCI
BH CV STRESS COMMENTS STAGE 1: NORMAL
BH CV STRESS DOSE REGADENOSON STAGE 1: 0.4
BH CV STRESS DURATION MIN STAGE 1: 0
BH CV STRESS DURATION SEC STAGE 1: 10
BH CV STRESS NUCLEAR ISOTOPE DOSE: 30 MCI
BH CV STRESS PROTOCOL 1: NORMAL
BH CV STRESS RECOVERY BP: NORMAL MMHG
BH CV STRESS RECOVERY HR: 67 BPM
BH CV STRESS STAGE 1: 1
MAXIMAL PREDICTED HEART RATE: 162 BPM
PERCENT MAX PREDICTED HR: 51.23 %
STRESS BASELINE BP: NORMAL MMHG
STRESS BASELINE HR: 54 BPM
STRESS PERCENT HR: 60 %
STRESS POST PEAK BP: NORMAL MMHG
STRESS POST PEAK HR: 83 BPM
STRESS TARGET HR: 138 BPM

## 2023-10-26 ENCOUNTER — TELEPHONE (OUTPATIENT)
Dept: CARDIOLOGY | Facility: CLINIC | Age: 58
End: 2023-10-26
Payer: COMMERCIAL

## 2023-10-26 NOTE — TELEPHONE ENCOUNTER
RELY  STRESS  Called patient to notify of no acute findings or abnormalities. Keep follow up as scheduled. If you have any problem between now and then give our office a call.   ----- Message from Nini Jimenez MA sent at 10/26/2023 12:10 PM EDT -----  Regarding: FW:    ----- Message -----  From: Adolfo Dumont APRN  Sent: 10/26/2023   7:09 AM EDT  To: Nini Jimenez MA  Subject: FW:                                              Negative stress test keep follow up  ----- Message -----  From: Devan Wong MD  Sent: 10/25/2023   5:55 PM EDT  To: DURAN Martin

## 2023-10-27 ENCOUNTER — TELEPHONE (OUTPATIENT)
Dept: CARDIOLOGY | Facility: CLINIC | Age: 58
End: 2023-10-27
Payer: COMMERCIAL

## 2023-10-27 DIAGNOSIS — Z45.010 PACEMAKER AT END OF BATTERY LIFE: Primary | ICD-10-CM

## 2023-10-27 NOTE — TELEPHONE ENCOUNTER
Patient was here today for Pacer check could not check pacer due to battery life being . Referral to Dr Whittington for gen change.

## 2023-10-31 ENCOUNTER — TELEPHONE (OUTPATIENT)
Dept: CARDIOLOGY | Facility: CLINIC | Age: 58
End: 2023-10-31
Payer: COMMERCIAL

## 2023-10-31 LAB
BH CV XLRA MEAS LEFT DIST CCA EDV: -27.7 CM/SEC
BH CV XLRA MEAS LEFT DIST CCA PSV: -81.4 CM/SEC
BH CV XLRA MEAS LEFT DIST ICA EDV: -36.4 CM/SEC
BH CV XLRA MEAS LEFT DIST ICA PSV: -95.3 CM/SEC
BH CV XLRA MEAS LEFT ICA/CCA RATIO: 1.6
BH CV XLRA MEAS LEFT MID ICA EDV: -45 CM/SEC
BH CV XLRA MEAS LEFT MID ICA PSV: -127 CM/SEC
BH CV XLRA MEAS LEFT PROX CCA EDV: 26.9 CM/SEC
BH CV XLRA MEAS LEFT PROX CCA PSV: 91 CM/SEC
BH CV XLRA MEAS LEFT PROX ECA PSV: -109 CM/SEC
BH CV XLRA MEAS LEFT PROX ICA EDV: -14.7 CM/SEC
BH CV XLRA MEAS LEFT PROX ICA PSV: -69.3 CM/SEC
BH CV XLRA MEAS LEFT VERTEBRAL A EDV: 14.3 CM/SEC
BH CV XLRA MEAS LEFT VERTEBRAL A PSV: 47.7 CM/SEC
BH CV XLRA MEAS RIGHT DIST CCA EDV: 27.3 CM/SEC
BH CV XLRA MEAS RIGHT DIST CCA PSV: 77.7 CM/SEC
BH CV XLRA MEAS RIGHT DIST ICA EDV: -39.1 CM/SEC
BH CV XLRA MEAS RIGHT DIST ICA PSV: -93.6 CM/SEC
BH CV XLRA MEAS RIGHT ICA/CCA RATIO: 1.5
BH CV XLRA MEAS RIGHT MID ICA EDV: -40.6 CM/SEC
BH CV XLRA MEAS RIGHT MID ICA PSV: -115 CM/SEC
BH CV XLRA MEAS RIGHT PROX CCA EDV: 13 CM/SEC
BH CV XLRA MEAS RIGHT PROX CCA PSV: 70.2 CM/SEC
BH CV XLRA MEAS RIGHT PROX ECA PSV: -118 CM/SEC
BH CV XLRA MEAS RIGHT PROX ICA EDV: -23.6 CM/SEC
BH CV XLRA MEAS RIGHT PROX ICA PSV: -72.7 CM/SEC
BH CV XLRA MEAS RIGHT VERTEBRAL A EDV: 16.5 CM/SEC
BH CV XLRA MEAS RIGHT VERTEBRAL A PSV: 35.5 CM/SEC

## 2023-10-31 NOTE — TELEPHONE ENCOUNTER
Unable to interrogate device.  Appt changed from completed to cancelled due to this.  Dr. Wong will sign facesheet and will scan for Irma to address the referral.

## 2023-11-01 LAB
BH CV ECHO MEAS - ACS: 1.84 CM
BH CV ECHO MEAS - AO MAX PG: 7.1 MMHG
BH CV ECHO MEAS - AO MEAN PG: 4 MMHG
BH CV ECHO MEAS - AO ROOT DIAM: 3 CM
BH CV ECHO MEAS - AO V2 MAX: 133 CM/SEC
BH CV ECHO MEAS - AO V2 VTI: 31.6 CM
BH CV ECHO MEAS - EDV(CUBED): 93 ML
BH CV ECHO MEAS - EDV(MOD-SP4): 78.6 ML
BH CV ECHO MEAS - EF(MOD-SP4): 52.2 %
BH CV ECHO MEAS - EF_3D-VOL: 56 %
BH CV ECHO MEAS - ESV(CUBED): 24.4 ML
BH CV ECHO MEAS - ESV(MOD-SP4): 37.6 ML
BH CV ECHO MEAS - FS: 36 %
BH CV ECHO MEAS - IVS/LVPW: 1.04 CM
BH CV ECHO MEAS - IVSD: 1.42 CM
BH CV ECHO MEAS - LA DIMENSION: 3.9 CM
BH CV ECHO MEAS - LAT PEAK E' VEL: 7.6 CM/SEC
BH CV ECHO MEAS - LV DIASTOLIC VOL/BSA (35-75): 39 CM2
BH CV ECHO MEAS - LV MASS(C)D: 248.3 GRAMS
BH CV ECHO MEAS - LV SYSTOLIC VOL/BSA (12-30): 18.6 CM2
BH CV ECHO MEAS - LVIDD: 4.5 CM
BH CV ECHO MEAS - LVIDS: 2.9 CM
BH CV ECHO MEAS - LVPWD: 1.36 CM
BH CV ECHO MEAS - MED PEAK E' VEL: 5.1 CM/SEC
BH CV ECHO MEAS - MV A MAX VEL: 95.1 CM/SEC
BH CV ECHO MEAS - MV DEC TIME: 0.19 SEC
BH CV ECHO MEAS - MV E MAX VEL: 101 CM/SEC
BH CV ECHO MEAS - MV E/A: 1.06
BH CV ECHO MEAS - RAP SYSTOLE: 10 MMHG
BH CV ECHO MEAS - RVSP: 36.6 MMHG
BH CV ECHO MEAS - SI(MOD-SP4): 20.3 ML/M2
BH CV ECHO MEAS - SV(MOD-SP4): 41 ML
BH CV ECHO MEAS - TR MAX PG: 26.6 MMHG
BH CV ECHO MEAS - TR MAX VEL: 257.9 CM/SEC
BH CV ECHO MEASUREMENTS AVERAGE E/E' RATIO: 15.91
BH CV XLRA - RV BASE: 3.5 CM
BH CV XLRA - RV LENGTH: 7.6 CM
BH CV XLRA - RV MID: 2.5 CM
LEFT ATRIUM VOLUME INDEX: 26.8 ML/M2

## 2023-11-02 ENCOUNTER — TELEPHONE (OUTPATIENT)
Dept: CARDIOLOGY | Facility: CLINIC | Age: 58
End: 2023-11-02
Payer: COMMERCIAL

## 2023-11-02 NOTE — TELEPHONE ENCOUNTER
----- Message from DURAN Martin sent at 11/1/2023  9:39 AM EDT -----  Hemodynamically significant carotid artery disease.  However her thyroid nodule was noted on the left side.  Please forward to patient's PCP for further evaluation and management.

## 2023-11-02 NOTE — TELEPHONE ENCOUNTER
There is not hemodynamic significant carotid artery disease. There was a thyroid nodule noted. I will forward labs to PCP.

## 2023-11-03 ENCOUNTER — TELEPHONE (OUTPATIENT)
Dept: CARDIOLOGY | Facility: CLINIC | Age: 58
End: 2023-11-03
Payer: COMMERCIAL

## 2023-11-03 NOTE — TELEPHONE ENCOUNTER
ECHO  Pt notified of no acute findings. Provider will discuss results at f/u. Pt reminded of appt date and time.  ----- Message from Nini Jimenez MA sent at 11/2/2023  3:07 PM EDT -----    ----- Message -----  From: Adolfo Dumont APRN  Sent: 11/2/2023  12:42 PM EDT  To: Nini Jimenez MA    There is no acute findings on the echocardiogram.  Keep follow-up.

## 2023-11-20 ENCOUNTER — TELEPHONE (OUTPATIENT)
Dept: CARDIOLOGY | Facility: CLINIC | Age: 58
End: 2023-11-20
Payer: COMMERCIAL

## 2023-11-20 ENCOUNTER — CLINICAL SUPPORT (OUTPATIENT)
Dept: CARDIOLOGY | Facility: CLINIC | Age: 58
End: 2023-11-20
Payer: COMMERCIAL

## 2023-11-20 VITALS — OXYGEN SATURATION: 92 % | SYSTOLIC BLOOD PRESSURE: 135 MMHG | HEART RATE: 82 BPM | DIASTOLIC BLOOD PRESSURE: 85 MMHG

## 2023-11-20 DIAGNOSIS — Z95.0 PRESENCE OF CARDIAC PACEMAKER: Primary | ICD-10-CM

## 2023-11-20 NOTE — TELEPHONE ENCOUNTER
Spoke with Karmen DREW from [employer] , pt's device card was mailed out on 11/15, pt should be receiving card shortly. Called pt to let her know to expect card in the mail anytime, pt voiced understanding

## 2023-11-20 NOTE — LETTER
November 20, 2023     Patient: Cris Canada   YOB: 1965   Date of Visit: 11/20/2023     To whom it may concern:         Patient had a new pacemaker implanted on 11/9/23 and is waiting to receive her implant card.          Sincerely,        DURAN Martin

## 2023-11-20 NOTE — PROGRESS NOTES
Cris Canada  1965 11/20/2023   ?   Chief Complaint   Patient presents with    Nurse Visit       ?   HPI:   ?   ?   ? patient presented to the office today for S/P pacer replacement on 11/9/2023, surgical area clean and closed with some redness and flaky skin possibly due to contact dermatitis from bandages. Patient was instructed to keep area clean and dry and not to put any ointments on or around site. Patient denies any fever or drainage.    Current Outpatient Medications:     ALBUTEROL IN, Inhale 2.5 mg As Needed. Neb soultion, Disp: , Rfl:     albuterol sulfate  (90 Base) MCG/ACT inhaler, Inhale 2 puffs Every 4 (Four) Hours As Needed for Wheezing., Disp: , Rfl:     amLODIPine (NORVASC) 2.5 MG tablet, Take 1 tablet by mouth Daily., Disp: , Rfl:     apixaban (ELIQUIS) 5 MG tablet tablet, Take 1 tablet by mouth 2 (Two) Times a Day., Disp: , Rfl:     buPROPion XL (WELLBUTRIN XL) 150 MG 24 hr tablet, Take 1 tablet by mouth Every Morning., Disp: , Rfl:     Calcium Carb-Cholecalciferol (CALCIUM 500 + D3 PO), Take  by mouth. 500-400 1tab po qd, Disp: , Rfl:     fluticasone (FLONASE) 50 MCG/ACT nasal spray, 2 sprays into the nostril(s) as directed by provider Daily., Disp: , Rfl:     Fluticasone Furoate-Vilanterol (Breo Ellipta) 200-25 MCG/ACT inhaler, Inhale 1 puff Daily., Disp: , Rfl:     furosemide (LASIX) 40 MG tablet, Take 1 tablet by mouth 2 (Two) Times a Day., Disp: , Rfl:     isosorbide dinitrate (ISORDIL) 10 MG tablet, Take 1 tablet by mouth 2 (Two) Times a Day., Disp: , Rfl:     metoprolol tartrate (LOPRESSOR) 25 MG tablet, Take 1 tablet by mouth 2 (Two) Times a Day., Disp: , Rfl:     montelukast (SINGULAIR) 10 MG tablet, Take 1 tablet by mouth Every Night., Disp: , Rfl:     multivitamin with minerals tablet tablet, Take 1 tablet by mouth Daily., Disp: , Rfl:     pantoprazole (PROTONIX) 40 MG EC tablet, Take 1 tablet by mouth Daily., Disp: , Rfl:     potassium chloride 10 MEQ CR tablet, Take 1  tablet by mouth Daily., Disp: , Rfl:     sertraline (ZOLOFT) 100 MG tablet, Take 1 tablet by mouth Daily., Disp: , Rfl:     sertraline (ZOLOFT) 25 MG tablet, Take 1 tablet by mouth Daily., Disp: , Rfl:     sodium chloride 0.65 % nasal spray, 1 spray into the nostril(s) as directed by provider As Needed for Congestion., Disp: , Rfl:     traZODone (DESYREL) 100 MG tablet, Take 1 tablet by mouth Every Night., Disp: , Rfl:     vitamin D3 125 MCG (5000 UT) capsule capsule, Take 1 capsule by mouth Daily., Disp: , Rfl:    ?   ?   Penicillins       Procedures     ?   Assessment & Plan    ?   ?   ?   wound check / pacer replacement     Surgical Site checked by Adolfo Dumont NP     1.patient to keep site clean and dry.   2.patient to keep device check for 12/15/2023 , patient was given statement of device implant until she receives device card. We have contacted our rep for assistant.   3.patient to monitor area and call with any changes or issues.   4.patient to call if red/ flaky skin continues and we can look at sending in steroid ointment for help.     Patient Verbalized Understanding.     AT [facility]

## 2023-11-22 ENCOUNTER — TELEPHONE (OUTPATIENT)
Dept: CARDIOLOGY | Facility: CLINIC | Age: 58
End: 2023-11-22
Payer: COMMERCIAL
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
## 2023-12-15 ENCOUNTER — OFFICE VISIT (OUTPATIENT)
Dept: CARDIOLOGY | Facility: CLINIC | Age: 58
End: 2023-12-15
Payer: COMMERCIAL

## 2023-12-15 DIAGNOSIS — I48.91 ATRIAL FIBRILLATION, UNSPECIFIED TYPE: Primary | ICD-10-CM

## 2024-02-22 ENCOUNTER — TELEPHONE (OUTPATIENT)
Dept: CARDIOLOGY | Facility: CLINIC | Age: 59
End: 2024-02-22
Payer: COMMERCIAL

## 2024-02-22 NOTE — TELEPHONE ENCOUNTER
REQUEST FOR CARDIAC CLEARANCE    Caller name: Apoorva Elizondo      Phone Number: [phone]     Surgeon's name: CASTILLO ELLIOT @ [facility]     Type of planned surgery: TEETH EXTRACTION/ ROOT REMOVAL/ alveoloplasty    Date of planned surgery: NEED CLEARANCE     Type of anesthesia: GENERAL     Have you been experiencing chest pain or shortness of breath? NO     Is your doctor requesting for you to stop any of your medications prior to your surgery? UNKNOWN, CALLER STATES PROBABLY ANY BLOOD THINNERS. PT STATE SHE WONT BE TAKING MEDS THAT MORNING.     Where should we fax the clearance to? UNKNOWN

## 2024-02-22 NOTE — TELEPHONE ENCOUNTER
I returned call to Care giver and let her know that we have not received cardiac clearance yet . I will complete that once we receive this.

## 2024-02-23 NOTE — TELEPHONE ENCOUNTER
I called [facility] to see if they can fax over a surgery clearance but number was busy . I will try back later. [phone]

## 2024-02-28 ENCOUNTER — TELEPHONE (OUTPATIENT)
Dept: CARDIOLOGY | Facility: CLINIC | Age: 59
End: 2024-02-28
Payer: COMMERCIAL

## 2024-02-28 NOTE — TELEPHONE ENCOUNTER
Received cardiac clearance request from Dr. Sharpe stating pt has full mouth extraction and bone smoothing w/IV sedation pending clearance and is requiring a cardiac clearance. Placed cardiac clearance request in Shania's inbox to review and address with provider.

## 2024-03-27 ENCOUNTER — TELEPHONE (OUTPATIENT)
Dept: CARDIOLOGY | Facility: CLINIC | Age: 59
End: 2024-03-27
Payer: COMMERCIAL

## 2024-03-27 DIAGNOSIS — R07.2 PRECORDIAL PAIN: ICD-10-CM

## 2024-03-27 DIAGNOSIS — I71.21 ANEURYSM OF ASCENDING AORTA WITHOUT RUPTURE: Primary | ICD-10-CM

## 2024-03-27 DIAGNOSIS — I48.0 PAROXYSMAL ATRIAL FIBRILLATION: ICD-10-CM

## 2024-03-27 DIAGNOSIS — I10 PRIMARY HYPERTENSION: ICD-10-CM

## 2024-03-27 NOTE — TELEPHONE ENCOUNTER
Caller: Apoorva Elizondo    Relationship: Emergency Contact    Best call back number: [phone]    What is the best time to reach you: ANY    What was the call regarding: INQUIRING IF YOU NEED TO CHECK THE ANEURYSM AS IT HAS NOT BEEN CHECKED FOR 1 YEAR. PLEASE CALL THE ABOVE TO ADVISE    Is it okay if the provider responds through MyChart: NO

## 2024-03-27 NOTE — TELEPHONE ENCOUNTER
Pt's CG Apoorva notified that JR ordered a Chest CT and our office will call and schedule it once insurance approves.  Will notify her of the appt date and time.  Apoorva verbalized an understanding.

## 2024-03-27 NOTE — TELEPHONE ENCOUNTER
I called Anaid Elizondo patient representative advised per JR patient can take Norco for pain. Please monitor blood pressure and let us know.

## 2024-03-27 NOTE — TELEPHONE ENCOUNTER
Caller: Apoorva Elizondo    Relationship: Emergency Contact    Best call back number: [phone]    What is the best time to reach you: ANY    What was the call regarding: ASKING IF THE PATIENT CAN TAKE NORCO AND CLEOCIN (NOT SURE OF THE SPELLING CAN'T SEE THE NAME WELL) FOR PAIN FROM A DENTAL PROCEDURE WITHOUT INTERFERING WITH OTHER MEDICINE OR HER CONDITION? PLEASE CALL THE ABOVE ASAP TO DISCUSS    Is it okay if the provider responds through MyChart: NO

## 2024-04-22 ENCOUNTER — OFFICE VISIT (OUTPATIENT)
Dept: CARDIOLOGY | Facility: CLINIC | Age: 59
End: 2024-04-22
Payer: COMMERCIAL

## 2024-04-22 VITALS
DIASTOLIC BLOOD PRESSURE: 83 MMHG | SYSTOLIC BLOOD PRESSURE: 125 MMHG | HEART RATE: 78 BPM | BODY MASS INDEX: 43.24 KG/M2 | OXYGEN SATURATION: 97 % | WEIGHT: 235 LBS | HEIGHT: 62 IN

## 2024-04-22 DIAGNOSIS — R07.2 PRECORDIAL PAIN: ICD-10-CM

## 2024-04-22 DIAGNOSIS — I48.91 ATRIAL FIBRILLATION, UNSPECIFIED TYPE: ICD-10-CM

## 2024-04-22 DIAGNOSIS — I48.0 PAROXYSMAL ATRIAL FIBRILLATION: ICD-10-CM

## 2024-04-22 DIAGNOSIS — I10 PRIMARY HYPERTENSION: ICD-10-CM

## 2024-04-22 DIAGNOSIS — I71.21 ANEURYSM OF ASCENDING AORTA WITHOUT RUPTURE: ICD-10-CM

## 2024-04-22 DIAGNOSIS — Z95.0 PRESENCE OF CARDIAC PACEMAKER: ICD-10-CM

## 2024-04-22 DIAGNOSIS — M79.89 LEG SWELLING: Primary | ICD-10-CM

## 2024-04-22 PROCEDURE — 1159F MED LIST DOCD IN RCRD: CPT | Performed by: NURSE PRACTITIONER

## 2024-04-22 PROCEDURE — 1160F RVW MEDS BY RX/DR IN RCRD: CPT | Performed by: NURSE PRACTITIONER

## 2024-04-22 PROCEDURE — 99214 OFFICE O/P EST MOD 30 MIN: CPT | Performed by: NURSE PRACTITIONER

## 2024-04-22 PROCEDURE — 93000 ELECTROCARDIOGRAM COMPLETE: CPT | Performed by: NURSE PRACTITIONER

## 2024-04-22 RX ORDER — VENLAFAXINE 37.5 MG/1
37.5 TABLET ORAL DAILY
COMMUNITY

## 2024-04-22 RX ORDER — VENLAFAXINE 75 MG/1
75 TABLET ORAL
COMMUNITY

## 2024-04-22 RX ORDER — BUMETANIDE 1 MG/1
1 TABLET ORAL DAILY
Qty: 90 TABLET | Refills: 1 | Status: SHIPPED | OUTPATIENT
Start: 2024-04-22

## 2024-04-22 NOTE — PROGRESS NOTES
Subjective     Cris Canada is a 58 y.o. female who presents to day for 7 month follow up , Atrial Fibrillation, Hypertension, and Chest Pain (On upper left side of chest radiates into left shoulder and arm. Her left arm gets cold.).    CHIEF COMPLIANT  Chief Complaint   Patient presents with    7 month follow up     Atrial Fibrillation    Hypertension    Chest Pain     On upper left side of chest radiates into left shoulder and arm. Her left arm gets cold.       Active Problems:  Problem List Items Addressed This Visit    None  Visit Diagnoses       Leg swelling    -  Primary    Relevant Medications    bumetanide (Bumex) 1 MG tablet    Other Relevant Orders    ECG 12 Lead    CT Angiogram Chest    Basic Metabolic Panel    Aneurysm of ascending aorta without rupture        Relevant Medications    bumetanide (Bumex) 1 MG tablet    Other Relevant Orders    ECG 12 Lead    CT Angiogram Chest    Basic Metabolic Panel    Precordial pain        Relevant Orders    ECG 12 Lead    Primary hypertension        Relevant Medications    bumetanide (Bumex) 1 MG tablet    Other Relevant Orders    ECG 12 Lead    Presence of cardiac pacemaker        Relevant Orders    ECG 12 Lead    Atrial fibrillation, unspecified type        Paroxysmal atrial fibrillation        Relevant Orders    ECG 12 Lead        1.  Chest pain  1.1 left heart cath 1/22: Left main normal, LAD normal, circumflex normal, RCA normal  1.2 stress test 10/23: Negative stress, post-rest EF 72%  2.  Palpitations  3.  Atrial fibrillation anticoagulated with Eliquis  4.  Lower extremity edema  5.  Chronic arterial hypertension  5.1 echocardiogram: EF 55 to 60%, grade 2 diastolic dysfunction, trivial MR AI and mild TR. RVSP mid to high 30s  6.  Ascending aortic aneurysm    HPI  HPI  Ms. Cris Canada is a 58-year-old female patient who is being followed up today for atrial fibrillation and chronic arterial hypertension.    Patient does have a history of atrial  fibrillation which she is on Eliquis for anticoagulation and rate control of metoprolol.  She denies any bleeding on the Eliquis.  She says that she does have nosebleeds occasionally and can last up to 20 to 30 minutes at a time.  Her most recent nosebleed was last night.  She does have some palpitations which she has an intermittent fluttering type sensation that occurs in her chest.  She says that this occurs on a rare occasion.    Patient also has chronic arterial hypertension in which she is treated with metoprolol.  Today her blood pressure is controlled at 125/83 heart rate is 78.  She does have some associated dizziness with position changes.    Patient does report chest pain that occurs in her left upper jaw left arm and left shoulder.  She is on antianginal therapy of amlodipine and isosorbide.  She reports that the pain occurs intermittently is sharp in nature.  She says it can last up to 5 to 10 minutes/ episode.  She says it occurs frequently roughly 1-2 times a day.  It can occur both with rest and exertion.  She does have associated shortness of breath.    Patient also reports lower extremity edema which she is on Lasix for diuretic therapy.  She says it is usually mild around the ankles but can get severe at times.  She does have grade 2 diastolic dysfunction per previous echocardiogram.    She also has a history of a ascending aortic aneurysm and which has not been monitored or evaluated in quite some time per patient report.      PRIOR MEDS  Current Outpatient Medications on File Prior to Visit   Medication Sig Dispense Refill    ALBUTEROL IN Inhale 2.5 mg As Needed. Neb soultion      albuterol sulfate  (90 Base) MCG/ACT inhaler Inhale 2 puffs Every 4 (Four) Hours As Needed for Wheezing.      amLODIPine (NORVASC) 2.5 MG tablet Take 1 tablet by mouth Daily.      apixaban (ELIQUIS) 5 MG tablet tablet Take 1 tablet by mouth 2 (Two) Times a Day.      budesonide (RINOCORT AQUA) 32 MCG/ACT nasal  spray 1 spray into the nostril(s) as directed by provider Daily.      isosorbide dinitrate (ISORDIL) 10 MG tablet Take 1 tablet by mouth 2 (Two) Times a Day.      metoprolol tartrate (LOPRESSOR) 25 MG tablet Take 1 tablet by mouth 2 (Two) Times a Day.      montelukast (SINGULAIR) 10 MG tablet Take 1 tablet by mouth Every Night.      multivitamin with minerals tablet tablet Take 1 tablet by mouth Daily.      potassium chloride 10 MEQ CR tablet Take 1 tablet by mouth Daily.      silver sulfadiazine (Silvadene) 1 % cream Apply 1 application  topically to the appropriate area as directed 2 (Two) Times a Day. Do not apply to the pacemaker incision 25 g 0    sodium chloride 0.65 % nasal spray 1 spray into the nostril(s) as directed by provider As Needed for Congestion.      traZODone (DESYREL) 100 MG tablet Take 1 tablet by mouth Every Night.      venlafaxine (EFFEXOR) 37.5 MG tablet Take 1 tablet by mouth Daily.      venlafaxine (EFFEXOR) 75 MG tablet Take 1 tablet by mouth every night at bedtime.      vitamin D3 125 MCG (5000 UT) capsule capsule Take 1 capsule by mouth Daily.       No current facility-administered medications on file prior to visit.       ALLERGIES  Fish-derived products, Adhesive tape, Latex, and Penicillins    HISTORY  Past Medical History:   Diagnosis Date    A-fib     Anxiety     Aortic aneurysm     COPD (chronic obstructive pulmonary disease)     Alpha Tripsen I    Depression     Hypertension     PTSD (post-traumatic stress disorder)        Social History     Socioeconomic History    Marital status: Single   Tobacco Use    Smoking status: Never   Vaping Use    Vaping status: Never Used   Substance and Sexual Activity    Alcohol use: Not Currently     Comment: OCC    Drug use: No    Sexual activity: Defer     Partners: Male       Family History   Problem Relation Age of Onset    Cancer Mother     Heart attack Father     Heart disease Father     Depression Father     Heart failure Sister     Leukemia  "Sister     Heart disease Brother        Review of Systems   Constitutional:  Negative for chills, fatigue and fever.   HENT:  Positive for nosebleeds. Negative for congestion, rhinorrhea and sore throat.    Eyes:  Positive for visual disturbance (sees black spots in vision).   Respiratory:  Positive for apnea (CPAP does not use all the time). Negative for chest tightness and shortness of breath.    Cardiovascular:  Positive for chest pain (sharp at times radiates into left arm), palpitations (flutters not often) and leg swelling (mild swelling in ankles).   Gastrointestinal:  Positive for diarrhea. Negative for constipation and nausea.   Musculoskeletal:  Negative for arthralgias, back pain and neck pain.   Allergic/Immunologic: Positive for environmental allergies and food allergies (fish derived products and pineapple).   Neurological:  Positive for dizziness (getting up and down) and weakness. Negative for syncope and light-headedness.   Hematological:  Bruises/bleeds easily.   Psychiatric/Behavioral:  Positive for sleep disturbance (SOB at night).        Objective     VITALS: /83 (BP Location: Left arm, Patient Position: Sitting, Cuff Size: Adult)   Pulse 78   Ht 157.5 cm (62.01\")   Wt 107 kg (235 lb)   SpO2 97%   BMI 42.97 kg/m²     LABS:   Lab Results (most recent)       None            IMAGING:   No Images in the past 120 days found..    EXAM:  Physical Exam  Vitals and nursing note reviewed.   Constitutional:       Appearance: She is well-developed.   HENT:      Head: Normocephalic.   Neck:      Thyroid: No thyroid mass.      Vascular: No carotid bruit or JVD.      Trachea: Trachea and phonation normal.   Cardiovascular:      Rate and Rhythm: Normal rate and regular rhythm.      Pulses:           Radial pulses are 2+ on the right side and 2+ on the left side.        Posterior tibial pulses are 2+ on the right side and 2+ on the left side.      Heart sounds: Normal heart sounds. No murmur heard.    "  No friction rub. No gallop.   Pulmonary:      Effort: Pulmonary effort is normal. No respiratory distress.      Breath sounds: Normal breath sounds. No wheezing or rales.   Musculoskeletal:         General: Swelling present. Normal range of motion.      Cervical back: Neck supple.   Skin:     General: Skin is warm and dry.      Capillary Refill: Capillary refill takes less than 2 seconds.      Findings: No rash.   Neurological:      Mental Status: She is alert and oriented to person, place, and time.   Psychiatric:         Speech: Speech normal.         Behavior: Behavior normal.         Thought Content: Thought content normal.         Judgment: Judgment normal.         Procedure     ECG 12 Lead    Date/Time: 4/22/2024 10:24 AM  Performed by: Adolfo Dumont APRN    Authorized by: Adolfo Dumont APRN  Comparison: compared with previous ECG from 9/18/2023  Comparison to previous ECG: New left axis  Rhythm: sinus rhythm  Rate: normal  BPM: 67  QRS axis: left  Other findings: non-specific ST-T wave changes  Comments: QTc 395 ms  No acute change             Assessment & Plan    Diagnosis Plan   1. Leg swelling  ECG 12 Lead    bumetanide (Bumex) 1 MG tablet    CT Angiogram Chest    Basic Metabolic Panel      2. Aneurysm of ascending aorta without rupture  ECG 12 Lead    bumetanide (Bumex) 1 MG tablet    CT Angiogram Chest    Basic Metabolic Panel      3. Precordial pain  ECG 12 Lead      4. Primary hypertension  ECG 12 Lead      5. Presence of cardiac pacemaker  ECG 12 Lead      6. Atrial fibrillation, unspecified type        7. Paroxysmal atrial fibrillation  ECG 12 Lead      1.  Due to patient's history of ascending aortic aneurysm I would like to do routine monitoring with a CTA of the chest.  This will also help evaluate for other potential causes of her chest pain as well given the fact of relatively normal coronary arteries 2 years ago per left heart catheterization and negative stress test in October 2023.  2.   Due to patient's grade 2 diastolic dysfunction increasing lower extremity edema I do think it is beneficial to switch her Lasix to Bumex to see if we can better diurese her.  3.  Patient's blood pressure is controlled on current blood pressure medication regimen.  No medication changes are warranted at this time.  Patient advised to monitor blood pressure on a daily basis and report any persistent highs or lows.  Set goal blood pressure for patient at 130/80 or below.  4.  Patient does have atrial fibrillation which seems to be relatively well-controlled with the metoprolol.  And she does have some nosebleeds with the Eliquis however if the nosebleeds continue we will consider left atrial appendage closure device.  At this time we will continue Eliquis.  5.  Informed of signs and symptoms of ACS and advised to seek emergent treatment for any new worsening symptoms.  Patient also advised sooner follow-up as needed.  Also advised to follow-up with family doctor as needed  This note is dictated utilizing voice recognition software.  Although this record has been proof read, transcriptional errors may still be present. If questions occur regarding the content of this record please do not hesitate to call our office.  I have reviewed and confirmed the accuracy of the ROS as documented by the MA/LPN/RN DURAN Martin    Return if symptoms worsen or fail to improve, for Next scheduled follow up.    Diagnoses and all orders for this visit:    1. Leg swelling (Primary)  -     ECG 12 Lead  -     bumetanide (Bumex) 1 MG tablet; Take 1 tablet by mouth Daily.  Dispense: 90 tablet; Refill: 1  -     CT Angiogram Chest; Future  -     Basic Metabolic Panel; Future    2. Aneurysm of ascending aorta without rupture  -     ECG 12 Lead  -     bumetanide (Bumex) 1 MG tablet; Take 1 tablet by mouth Daily.  Dispense: 90 tablet; Refill: 1  -     CT Angiogram Chest; Future  -     Basic Metabolic Panel; Future    3. Precordial pain  -      ECG 12 Lead    4. Primary hypertension  -     ECG 12 Lead    5. Presence of cardiac pacemaker  -     ECG 12 Lead    6. Atrial fibrillation, unspecified type    7. Paroxysmal atrial fibrillation  -     ECG 12 Lead        Cris Canada  reports that she has never smoked. She does not have any smokeless tobacco history on file. I have educated her on the risk of diseases from using tobacco products.         Class 3 Severe Obesity (BMI >=40). Obesity-related health conditions include the following: obstructive sleep apnea. . We discussed portion control and increasing exercise.           MEDS ORDERED DURING VISIT:  New Medications Ordered This Visit   Medications    bumetanide (Bumex) 1 MG tablet     Sig: Take 1 tablet by mouth Daily.     Dispense:  90 tablet     Refill:  1           This document has been electronically signed by Adolfo Dumont Jr., APRN  April 23, 2024 08:04 EDT

## 2024-04-25 ENCOUNTER — TELEPHONE (OUTPATIENT)
Dept: CARDIOLOGY | Facility: CLINIC | Age: 59
End: 2024-04-25
Payer: COMMERCIAL

## 2024-04-25 NOTE — TELEPHONE ENCOUNTER
Called and spoke with Apoorva who is on the pt's HIPAA and notified them that we have the AVS reprinted.  Inquired if they want to pick it up or our office mail it.  Pt request AVS to be mailed.  Address in EPIC confirmed.  EPIC chat message sent to [employer] CORNELIUS to mail the pt's AVS.

## 2024-04-25 NOTE — TELEPHONE ENCOUNTER
"Relay     \"let her know that she can stop by at her convenience at the  and request her after visit summary\"            Attempted to call patient to let pt know that she can stop by at her convenience at the  and request her after visit summary. Unable to reach left  to return call.  "

## 2024-04-25 NOTE — TELEPHONE ENCOUNTER
Caller: Apoorva Elizondo    Relationship to patient: Emergency Contact    Best call back [phone]    Patient is needing: AFTER SUMMARY  FROM [facility] APPT THIS WEEK REPRINTED SO THEY CAN STOP BY THE OFFICE TO PICK IT UP.

## 2024-04-26 ENCOUNTER — TELEPHONE (OUTPATIENT)
Dept: CARDIOLOGY | Facility: CLINIC | Age: 59
End: 2024-04-26
Payer: COMMERCIAL

## 2024-04-29 RX ORDER — METOPROLOL TARTRATE 50 MG/1
50 TABLET, FILM COATED ORAL 2 TIMES DAILY
Qty: 180 TABLET | Refills: 3 | Status: SHIPPED | OUTPATIENT
Start: 2024-04-29

## 2024-04-29 NOTE — TELEPHONE ENCOUNTER
I called and spoke with caregiver patient had recent labs at PCP office [facility] . I will request those labs. JR recommended that patient Metoprolol be increased to 50 mg po bid for nonsustained Vtach.    Patient symptoms:    She has been having Left shoulder pain into Left side since Saturday evening. She has not wanted to have this checked.

## 2024-04-29 NOTE — TELEPHONE ENCOUNTER
Due to the short runs of nonsustained ventricular tachycardia noted on her device check.  I would recommend increasing her metoprolol to 50 mg twice daily.

## 2024-04-30 ENCOUNTER — TELEPHONE (OUTPATIENT)
Dept: CARDIOLOGY | Facility: CLINIC | Age: 59
End: 2024-04-30
Payer: COMMERCIAL

## 2024-04-30 RX ORDER — ISOSORBIDE MONONITRATE 30 MG/1
30 TABLET, EXTENDED RELEASE ORAL DAILY
Qty: 30 TABLET | Refills: 11 | Status: SHIPPED | OUTPATIENT
Start: 2024-04-30

## 2024-04-30 NOTE — TELEPHONE ENCOUNTER
Care giver Apoorva  called in said that she has been having mild to moderate chest pain . She also has been having numbness ion face and tongue. This occurred this morning. I asked if she is having these symptoms now and she said that it has resolved . She said this has been happening . I let her know if symptoms resume while waiting for this call to take her to the ER. She did not want to go to ER now since symptoms are better. Patient blood pressure was normal this am but they did not write it down could not give me numbers.

## 2024-04-30 NOTE — TELEPHONE ENCOUNTER
Patient had the following test results.  1.1 left heart cath 1/22: Left main normal, LAD normal, circumflex normal, RCA normal  1.2 stress test 10/23: Negative stress, post-rest EF 72%    We can try increasing her isosorbide monohydrate to 30 mg daily.  Continue to monitor blood pressure.

## 2024-04-30 NOTE — TELEPHONE ENCOUNTER
I called and let Apoorva know that we can increase Isosorbide to 30 mg po qd . She did have 1.1 left heart cath 1/22: Left main normal, LAD normal, circumflex normal, RCA normal   1.2 stress test 10/23: Negative stress, post-rest EF 72% .   I did advise if patient has worsening symptoms to go to the ER.

## 2024-05-20 ENCOUNTER — TELEPHONE (OUTPATIENT)
Dept: CARDIOLOGY | Facility: CLINIC | Age: 59
End: 2024-05-20

## 2024-05-20 NOTE — TELEPHONE ENCOUNTER
Caller: Apoorva Elizondo    Relationship: Emergency Contact    Best call back number: [phone]     What orders are you requesting (i.e. lab or imaging): KIDNEY FUNCTION TEST    In what timeframe would the patient need to come in: ASAP    Where will you receive your lab/imaging services: [facility]    Additional notes: PATIENT WAS TOLD SHE NEEDS THIS DONE.

## 2024-06-19 ENCOUNTER — TELEPHONE (OUTPATIENT)
Dept: CARDIOLOGY | Facility: CLINIC | Age: 59
End: 2024-06-19
Payer: COMMERCIAL

## 2024-06-19 NOTE — TELEPHONE ENCOUNTER
I called patient and went over CTA chest results with patient as follows:    Stable ascending thoracic aortic aneurysm at 4.1 cm    Mild splenomegaly.  Please forward to patient's PCP.  Otherwise unremarkable.

## 2024-06-28 ENCOUNTER — TELEPHONE (OUTPATIENT)
Dept: CARDIOLOGY | Facility: CLINIC | Age: 59
End: 2024-06-28
Payer: COMMERCIAL

## 2024-06-28 NOTE — TELEPHONE ENCOUNTER
Obtained records from [facility]. CTA chest was performed on 9/13/23(placed in scan bin). States size was 3.9.     I called and explained that it grew 0.2 and surgery is not done until 5cm I believe. She verbalized understanding.

## 2024-06-28 NOTE — TELEPHONE ENCOUNTER
Patient is going to try to get records, I informed her that we will also try to get the records. Test was done over a year ago. Patient confirmed upcoming appointment.    As far as the pain, vomiting, acid reflux the patient is going to follow up with her PCP in regards to those symptoms.

## 2024-06-28 NOTE — TELEPHONE ENCOUNTER
Caller: MikhailApoorva    Relationship: Emergency Contact    Best call back number: [phone]    What is the best time to reach you: ANY    Who are you requesting to speak with (clinical staff, provider,  specific staff member): CLINICAL      What was the call regarding: PT'S CARE GIVER IS WANTING TO KNOW MORE ABOUT THE AORTIC ANEURYSM PT HAS, THEY DID A CTA RECENTLY WITH JR AND IT SHOWED 4.1, THE CTA BEFORE THAT WAS DONE AT [facility], THEY ARE WANTING TO KNOW FROM THAT CTA TO THE ONE WE DID IF THERE WAS ANY CHANGE BETWEEN THE TWO CTAS FOR THAT ANEURYSM. IF POSSIBLE, CAN WE REACH OUT TO [facility] TO FIND THE CTA SHE DID AND COMPARE AND CONTRAST.       ALSO, THIS MORNING, PT WOKE UP THIS MORNING THROWING UP WITH PAIN IN HER RIGHT SHOULDER BLADE. SHE HAD SOME MAJOR HEART BURN AND HER THROAT WAS BURNING.

## 2024-07-10 ENCOUNTER — TELEPHONE (OUTPATIENT)
Dept: CARDIOLOGY | Facility: CLINIC | Age: 59
End: 2024-07-10
Payer: COMMERCIAL

## 2024-07-10 NOTE — TELEPHONE ENCOUNTER
Pt called [facility] and was transferred by Philip.  Pt reports face drooping and slurred speech.  Pt instructed to go to the closest ED as provider protocol.  Pt instructed to call our office after to ensure her f/u later this month is soon enough.  Pt verbalized an understanding.

## 2024-09-03 DIAGNOSIS — I71.21 ANEURYSM OF ASCENDING AORTA WITHOUT RUPTURE: ICD-10-CM

## 2024-09-03 DIAGNOSIS — M79.89 LEG SWELLING: ICD-10-CM

## 2024-09-03 RX ORDER — BUMETANIDE 1 MG/1
1 TABLET ORAL DAILY
Qty: 30 TABLET | Refills: 1 | Status: SHIPPED | OUTPATIENT
Start: 2024-09-03

## 2024-10-30 DIAGNOSIS — M79.89 LEG SWELLING: ICD-10-CM

## 2024-10-30 DIAGNOSIS — I71.21 ANEURYSM OF ASCENDING AORTA WITHOUT RUPTURE: ICD-10-CM

## 2024-10-31 RX ORDER — BUMETANIDE 1 MG/1
1 TABLET ORAL DAILY
Qty: 30 TABLET | Refills: 1 | Status: SHIPPED | OUTPATIENT
Start: 2024-10-31

## 2024-11-22 ENCOUNTER — OFFICE VISIT (OUTPATIENT)
Dept: CARDIOLOGY | Facility: CLINIC | Age: 59
End: 2024-11-22
Payer: MEDICAID

## 2024-11-22 DIAGNOSIS — I49.5 SICK SINUS SYNDROME: Primary | ICD-10-CM

## 2024-12-09 ENCOUNTER — OFFICE VISIT (OUTPATIENT)
Dept: CARDIOLOGY | Facility: CLINIC | Age: 59
End: 2024-12-09
Payer: MEDICAID

## 2024-12-09 VITALS
HEART RATE: 68 BPM | HEIGHT: 62 IN | DIASTOLIC BLOOD PRESSURE: 84 MMHG | OXYGEN SATURATION: 93 % | SYSTOLIC BLOOD PRESSURE: 130 MMHG | WEIGHT: 241.8 LBS | BODY MASS INDEX: 44.5 KG/M2

## 2024-12-09 DIAGNOSIS — I73.9 CLAUDICATION: ICD-10-CM

## 2024-12-09 DIAGNOSIS — I47.29 NONSUSTAINED VENTRICULAR TACHYCARDIA: ICD-10-CM

## 2024-12-09 DIAGNOSIS — I10 PRIMARY HYPERTENSION: ICD-10-CM

## 2024-12-09 DIAGNOSIS — K62.5 BRBPR (BRIGHT RED BLOOD PER RECTUM): ICD-10-CM

## 2024-12-09 DIAGNOSIS — R06.02 SHORTNESS OF BREATH: ICD-10-CM

## 2024-12-09 DIAGNOSIS — I49.5 SICK SINUS SYNDROME: Primary | ICD-10-CM

## 2024-12-09 DIAGNOSIS — I48.0 PAROXYSMAL ATRIAL FIBRILLATION: ICD-10-CM

## 2024-12-09 DIAGNOSIS — I71.21 ANEURYSM OF ASCENDING AORTA WITHOUT RUPTURE: ICD-10-CM

## 2024-12-09 DIAGNOSIS — Z95.0 PRESENCE OF CARDIAC PACEMAKER: ICD-10-CM

## 2024-12-09 DIAGNOSIS — M79.89 LEG SWELLING: ICD-10-CM

## 2024-12-09 DIAGNOSIS — R31.9 HEMATURIA, UNSPECIFIED TYPE: ICD-10-CM

## 2024-12-09 PROCEDURE — 99214 OFFICE O/P EST MOD 30 MIN: CPT | Performed by: NURSE PRACTITIONER

## 2024-12-09 PROCEDURE — 1160F RVW MEDS BY RX/DR IN RCRD: CPT | Performed by: NURSE PRACTITIONER

## 2024-12-09 PROCEDURE — 1159F MED LIST DOCD IN RCRD: CPT | Performed by: NURSE PRACTITIONER

## 2024-12-09 PROCEDURE — 93000 ELECTROCARDIOGRAM COMPLETE: CPT | Performed by: NURSE PRACTITIONER

## 2024-12-09 RX ORDER — METOPROLOL TARTRATE 100 MG/1
25 TABLET ORAL 2 TIMES DAILY
Qty: 180 TABLET | Refills: 3 | Status: SHIPPED | OUTPATIENT
Start: 2024-12-09

## 2024-12-09 NOTE — PROGRESS NOTES
Subjective     Cris Canada is a 59 y.o. female who presents to day for Palpitations (Skips almost feels like it stops for a second ), Fatigue, Edema (Will have definite sock line), and Shortness of Breath.    CHIEF COMPLIANT  Chief Complaint   Patient presents with    Palpitations     Skips almost feels like it stops for a second     Fatigue    Edema     Will have definite sock line    Shortness of Breath       Active Problems:  Problem List Items Addressed This Visit    None  Visit Diagnoses       Sick sinus syndrome    -  Primary    Relevant Medications    metoprolol tartrate (LOPRESSOR) 100 MG tablet    Other Relevant Orders    ECG 12 Lead    Comprehensive Metabolic Panel    TSH    Lipid Panel    CBC & Differential    Magnesium    Hemoglobin A1c    Adult Transthoracic Echo Complete W/ Cont if Necessary Per Protocol    POC Occult Blood Stool    Urinalysis With Culture If Indicated -    D-dimer, Quantitative    proBNP    Aneurysm of ascending aorta without rupture        Relevant Orders    ECG 12 Lead    Comprehensive Metabolic Panel    TSH    Lipid Panel    CBC & Differential    Magnesium    Hemoglobin A1c    Adult Transthoracic Echo Complete W/ Cont if Necessary Per Protocol    POC Occult Blood Stool    Urinalysis With Culture If Indicated -    D-dimer, Quantitative    proBNP    Primary hypertension        Relevant Medications    metoprolol tartrate (LOPRESSOR) 100 MG tablet    Other Relevant Orders    ECG 12 Lead    Comprehensive Metabolic Panel    TSH    Lipid Panel    CBC & Differential    Magnesium    Hemoglobin A1c    Adult Transthoracic Echo Complete W/ Cont if Necessary Per Protocol    POC Occult Blood Stool    Urinalysis With Culture If Indicated -    D-dimer, Quantitative    proBNP    Paroxysmal atrial fibrillation        Relevant Medications    metoprolol tartrate (LOPRESSOR) 100 MG tablet    Other Relevant Orders    ECG 12 Lead    Comprehensive Metabolic Panel    TSH    Lipid Panel    CBC &  Differential    Magnesium    Hemoglobin A1c    Adult Transthoracic Echo Complete W/ Cont if Necessary Per Protocol    POC Occult Blood Stool    Urinalysis With Culture If Indicated -    D-dimer, Quantitative    proBNP    Presence of cardiac pacemaker        Relevant Orders    ECG 12 Lead    Comprehensive Metabolic Panel    TSH    Lipid Panel    CBC & Differential    Magnesium    Hemoglobin A1c    Adult Transthoracic Echo Complete W/ Cont if Necessary Per Protocol    POC Occult Blood Stool    Urinalysis With Culture If Indicated -    D-dimer, Quantitative    proBNP    Shortness of breath        Relevant Orders    ECG 12 Lead    Comprehensive Metabolic Panel    TSH    Lipid Panel    CBC & Differential    Magnesium    Hemoglobin A1c    Adult Transthoracic Echo Complete W/ Cont if Necessary Per Protocol    POC Occult Blood Stool    Urinalysis With Culture If Indicated -    D-dimer, Quantitative    proBNP    Nonsustained ventricular tachycardia        Relevant Medications    metoprolol tartrate (LOPRESSOR) 100 MG tablet    Other Relevant Orders    ECG 12 Lead    Comprehensive Metabolic Panel    TSH    Lipid Panel    CBC & Differential    Magnesium    Hemoglobin A1c    Adult Transthoracic Echo Complete W/ Cont if Necessary Per Protocol    POC Occult Blood Stool    Urinalysis With Culture If Indicated -    D-dimer, Quantitative    proBNP    Claudication        Relevant Orders    Doppler Ankle Brachial Index Single Level CAR    Adult Transthoracic Echo Complete W/ Cont if Necessary Per Protocol    POC Occult Blood Stool    Urinalysis With Culture If Indicated -    D-dimer, Quantitative    proBNP    Leg swelling        Relevant Orders    Doppler Ankle Brachial Index Single Level CAR    Adult Transthoracic Echo Complete W/ Cont if Necessary Per Protocol    POC Occult Blood Stool    Urinalysis With Culture If Indicated -    D-dimer, Quantitative    proBNP    BRBPR (bright red blood per rectum)        Relevant Orders    POC  Occult Blood Stool    Urinalysis With Culture If Indicated -    D-dimer, Quantitative    proBNP    Hematuria, unspecified type        Relevant Orders    POC Occult Blood Stool    Urinalysis With Culture If Indicated -    D-dimer, Quantitative    proBNP        Problem list:  1.  Chest pain  1.1 left heart cath 1/22: Left main normal, LAD normal, circumflex normal, RCA normal  1.2 stress test 10/23: Negative stress, post-rest EF 72%  2.  Palpitations  3.  Atrial fibrillation anticoagulated with Eliquis  4.  Lower extremity edema  5.  Chronic arterial hypertension  5.1 echocardiogram: EF 55 to 60%, grade 2 diastolic dysfunction, trivial MR AI and mild TR. RVSP mid to high 30s  6.  Ascending aortic aneurysm 4.1 cm  4/4    HPI  HPI  Ms. Cris Canada is a 58-year-old female patient who is being followed up today for atrial fibrillation and chronic arterial hypertension.     Patient does have a history of atrial fibrillation which she is on Eliquis for anticoagulation and rate control of metoprolol.  Patient does report palpitations she has intermittent skipping and pausing like sensation that occurs in her chest.  Did review patient's most recent pacer interrogation.  She is atrially paced 17% of the time with the battery life is 7 years and 6 months.  She had less than 1% total atrial fibrillation burden.  Longest being 4 hours was 25 seconds.  3 runs of SVT at the longest being 1 minute and 8 seconds.  She had 10 episodes of nonsustained VT with the longest lasting 25 seconds.  Patient does report some bright red blood per rectum as well as some hematuria.  She says that she has chronic diarrhea and is just like water and she will notice some blood in the stool.  She says it happens every time she uses the bathroom.    Patient also has chronic arterial hypertension in which she is treated with metoprolol.  Today her blood pressure is 130/84 heart rate is 68.    She also has a history of a ascending aortic aneurysm and  which has not been monitored or evaluated in quite some time per patient report.  Her ascending aortic aneurysm is at 4.1 cm.  As of April    Patient does report claudication-like symptoms where she says her legs hurt all the time.  She is quite a bit worse when she walks.  She says her feet state none.  She says this is lifestyle changing.  Says she will sit at home with her feet propped up trying to avoid the pain.    Patient does have shortness of breath that occurs with activity and at rest.  Minimal activity causes her to be short of breath.  Also reports shortness of breath when lying flat.  PRIOR MEDS  Current Outpatient Medications on File Prior to Visit   Medication Sig Dispense Refill    ALBUTEROL IN Inhale 2.5 mg As Needed. Neb soultion      albuterol sulfate  (90 Base) MCG/ACT inhaler Inhale 2 puffs Every 4 (Four) Hours As Needed for Wheezing.      amLODIPine (NORVASC) 2.5 MG tablet Take 1 tablet by mouth Daily.      apixaban (ELIQUIS) 5 MG tablet tablet Take 1 tablet by mouth 2 (Two) Times a Day.      budesonide (RINOCORT AQUA) 32 MCG/ACT nasal spray Administer 1 spray into the nostril(s) as directed by provider Daily.      bumetanide (BUMEX) 1 MG tablet TAKE 1 TABLET DAILY 30 tablet 1    isosorbide mononitrate (IMDUR) 30 MG 24 hr tablet Take 1 tablet by mouth Daily. 30 tablet 11    montelukast (SINGULAIR) 10 MG tablet Take 1 tablet by mouth Every Night.      multivitamin with minerals tablet tablet Take 1 tablet by mouth Daily.      potassium chloride 10 MEQ CR tablet Take 1 tablet by mouth Daily.      sodium chloride 0.65 % nasal spray Administer 1 spray into the nostril(s) as directed by provider As Needed for Congestion.      traZODone (DESYREL) 100 MG tablet Take 1 tablet by mouth Every Night.      venlafaxine (EFFEXOR) 37.5 MG tablet Take 1 tablet by mouth Daily.      venlafaxine (EFFEXOR) 75 MG tablet Take 1 tablet by mouth every night at bedtime.      vitamin D3 125 MCG (5000 UT) capsule  capsule Take 1 capsule by mouth Daily.      [DISCONTINUED] metoprolol tartrate (LOPRESSOR) 50 MG tablet Take 1 tablet by mouth 2 (Two) Times a Day. 180 tablet 3    [DISCONTINUED] silver sulfadiazine (Silvadene) 1 % cream Apply 1 application  topically to the appropriate area as directed 2 (Two) Times a Day. Do not apply to the pacemaker incision 25 g 0     No current facility-administered medications on file prior to visit.       ALLERGIES  Fish-derived products, Adhesive tape, Latex, and Penicillins    HISTORY  Past Medical History:   Diagnosis Date    A-fib     Anxiety     Aortic aneurysm     COPD (chronic obstructive pulmonary disease)     Alpha Tripsen I    Depression     Hypertension     PTSD (post-traumatic stress disorder)        Social History     Socioeconomic History    Marital status: Single   Tobacco Use    Smoking status: Never   Vaping Use    Vaping status: Never Used   Substance and Sexual Activity    Alcohol use: Not Currently     Comment: OCC    Drug use: No    Sexual activity: Defer     Partners: Male       Family History   Problem Relation Age of Onset    Cancer Mother     Heart attack Father     Heart disease Father     Depression Father     Heart failure Sister     Leukemia Sister     Heart disease Brother        Review of Systems   Constitutional:  Positive for fatigue. Negative for chills and fever.   HENT:  Negative for congestion, rhinorrhea and sore throat.    Eyes:  Negative for visual disturbance.   Respiratory:  Positive for apnea (BYPAP) and shortness of breath (with both activity and at rest). Negative for chest tightness.    Cardiovascular:  Positive for palpitations (skips feels like to pauses for a second) and leg swelling (has sock line). Negative for chest pain.   Gastrointestinal:  Positive for diarrhea. Negative for constipation and nausea.   Musculoskeletal:  Positive for back pain. Negative for arthralgias and neck pain.   Skin:  Negative for rash and wound.  "  Allergic/Immunologic: Positive for environmental allergies and food allergies (mushrooms and fish).   Neurological:  Positive for dizziness (bending over  and random), weakness and light-headedness. Negative for syncope.   Hematological:  Bruises/bleeds easily.   Psychiatric/Behavioral:  Positive for sleep disturbance (SOB at night trying to get new BYPAP).        Objective     VITALS: /84 (BP Location: Left arm, Patient Position: Sitting, Cuff Size: Adult)   Pulse 68   Ht 157.5 cm (62.01\")   Wt 110 kg (241 lb 12.8 oz)   SpO2 93%   BMI 44.21 kg/m²     LABS:   Lab Results (most recent)       None            IMAGING:   No Images in the past 120 days found..    EXAM:  Physical Exam  Vitals and nursing note reviewed.   Constitutional:       Appearance: She is well-developed.   HENT:      Head: Normocephalic.   Neck:      Thyroid: No thyroid mass.      Vascular: No carotid bruit or JVD.      Trachea: Trachea and phonation normal.   Cardiovascular:      Rate and Rhythm: Normal rate and regular rhythm.      Pulses:           Radial pulses are 2+ on the right side and 2+ on the left side.        Posterior tibial pulses are 2+ on the right side and 2+ on the left side.      Heart sounds: Normal heart sounds. No murmur heard.     No friction rub. No gallop.   Pulmonary:      Effort: Pulmonary effort is normal. No respiratory distress.      Breath sounds: Normal breath sounds. No wheezing or rales.   Musculoskeletal:         General: Swelling (1+) present. Normal range of motion.      Cervical back: Neck supple.   Skin:     General: Skin is warm and dry.      Capillary Refill: Capillary refill takes less than 2 seconds.      Findings: No rash.   Neurological:      Mental Status: She is alert and oriented to person, place, and time.   Psychiatric:         Speech: Speech normal.         Behavior: Behavior normal.         Thought Content: Thought content normal.         Judgment: Judgment normal.         Procedure "     ECG 12 Lead    Date/Time: 12/9/2024 11:26 AM  Performed by: Adolfo Dumont APRN    Authorized by: Adolfo Dumont APRN  Comparison: compared with previous ECG from 4/22/2024  Rhythm: sinus rhythm  Rate: normal  Conduction: non-specific intraventricular conduction delay  QRS axis: normal  Other findings: non-specific ST-T wave changes  Comments: Qtc 380ms  No acute changes             Assessment & Plan    Diagnosis Plan   1. Sick sinus syndrome  ECG 12 Lead    Comprehensive Metabolic Panel    TSH    Lipid Panel    CBC & Differential    Magnesium    Hemoglobin A1c    Adult Transthoracic Echo Complete W/ Cont if Necessary Per Protocol    POC Occult Blood Stool    Urinalysis With Culture If Indicated -    D-dimer, Quantitative    proBNP      2. Aneurysm of ascending aorta without rupture  ECG 12 Lead    Comprehensive Metabolic Panel    TSH    Lipid Panel    CBC & Differential    Magnesium    Hemoglobin A1c    Adult Transthoracic Echo Complete W/ Cont if Necessary Per Protocol    POC Occult Blood Stool    Urinalysis With Culture If Indicated -    D-dimer, Quantitative    proBNP      3. Primary hypertension  ECG 12 Lead    Comprehensive Metabolic Panel    TSH    Lipid Panel    CBC & Differential    Magnesium    Hemoglobin A1c    Adult Transthoracic Echo Complete W/ Cont if Necessary Per Protocol    POC Occult Blood Stool    Urinalysis With Culture If Indicated -    D-dimer, Quantitative    proBNP      4. Paroxysmal atrial fibrillation  ECG 12 Lead    Comprehensive Metabolic Panel    TSH    Lipid Panel    CBC & Differential    Magnesium    Hemoglobin A1c    Adult Transthoracic Echo Complete W/ Cont if Necessary Per Protocol    POC Occult Blood Stool    Urinalysis With Culture If Indicated -    D-dimer, Quantitative    proBNP      5. Presence of cardiac pacemaker  ECG 12 Lead    Comprehensive Metabolic Panel    TSH    Lipid Panel    CBC & Differential    Magnesium    Hemoglobin A1c    Adult Transthoracic Echo  Complete W/ Cont if Necessary Per Protocol    POC Occult Blood Stool    Urinalysis With Culture If Indicated -    D-dimer, Quantitative    proBNP      6. Shortness of breath  ECG 12 Lead    Comprehensive Metabolic Panel    TSH    Lipid Panel    CBC & Differential    Magnesium    Hemoglobin A1c    Adult Transthoracic Echo Complete W/ Cont if Necessary Per Protocol    POC Occult Blood Stool    Urinalysis With Culture If Indicated -    D-dimer, Quantitative    proBNP      7. Nonsustained ventricular tachycardia  ECG 12 Lead    Comprehensive Metabolic Panel    TSH    Lipid Panel    CBC & Differential    Magnesium    Hemoglobin A1c    Adult Transthoracic Echo Complete W/ Cont if Necessary Per Protocol    POC Occult Blood Stool    Urinalysis With Culture If Indicated -    D-dimer, Quantitative    proBNP      8. Claudication  Doppler Ankle Brachial Index Single Level CAR    Adult Transthoracic Echo Complete W/ Cont if Necessary Per Protocol    POC Occult Blood Stool    Urinalysis With Culture If Indicated -    D-dimer, Quantitative    proBNP      9. Leg swelling  Doppler Ankle Brachial Index Single Level CAR    Adult Transthoracic Echo Complete W/ Cont if Necessary Per Protocol    POC Occult Blood Stool    Urinalysis With Culture If Indicated -    D-dimer, Quantitative    proBNP      10. BRBPR (bright red blood per rectum)  POC Occult Blood Stool    Urinalysis With Culture If Indicated -    D-dimer, Quantitative    proBNP      11. Hematuria, unspecified type  POC Occult Blood Stool    Urinalysis With Culture If Indicated -    D-dimer, Quantitative    proBNP      1.  Patient continues to have palpitations skipping and pausing like sensations that occur in her chest.  She does have a history of sustained SVT nonsustained ventricular tachycardia and atrial fibrillation.  However we did review her most recent device interrogation which identified some nonsustained VT, sustained SVT, less than 1% atrial fibrillation burden.  We  will increase her metoprolol to 100 mg twice daily.    2.  We did discuss stopping the Eliquis due to the bleeding both in her stool and urine.  However we will get a CBC, Hemoccult, and UA to evaluate for blood.  If any of them come back abnormal we will discontinue the Eliquis and have a stat referral to GI.    3.  Patient's blood pressure is controlled on current blood pressure medication regimen.  No medication changes are warranted at this time.  Patient advised to monitor blood pressure on a daily basis and report any persistent highs or lows.  Set goal blood pressure for patient at 130/80 or below.    4.I would like to get ABIs for further evaluation of her claudication-like symptoms.    5.  Informed of signs and symptoms of ACS and advised to seek emergent treatment for any new worsening symptoms.  Patient also advised sooner follow-up as needed.  Also advised to follow-up with family doctor as needed  This note is dictated utilizing voice recognition software.  Although this record has been proof read, transcriptional errors may still be present. If questions occur regarding the content of this record please do not hesitate to call our office.  I have reviewed and confirmed the accuracy of the ROS as documented by the MA/LPN/RN DURAN Martin    Return if symptoms worsen or fail to improve, for Next scheduled follow up.    Diagnoses and all orders for this visit:    1. Sick sinus syndrome (Primary)  -     ECG 12 Lead  -     Comprehensive Metabolic Panel; Future  -     TSH; Future  -     Lipid Panel; Future  -     CBC & Differential; Future  -     Magnesium; Future  -     Hemoglobin A1c; Future  -     Adult Transthoracic Echo Complete W/ Cont if Necessary Per Protocol; Future  -     POC Occult Blood Stool  -     Urinalysis With Culture If Indicated -; Future  -     D-dimer, Quantitative; Future  -     proBNP; Future    2. Aneurysm of ascending aorta without rupture  -     ECG 12 Lead  -      Comprehensive Metabolic Panel; Future  -     TSH; Future  -     Lipid Panel; Future  -     CBC & Differential; Future  -     Magnesium; Future  -     Hemoglobin A1c; Future  -     Adult Transthoracic Echo Complete W/ Cont if Necessary Per Protocol; Future  -     POC Occult Blood Stool  -     Urinalysis With Culture If Indicated -; Future  -     D-dimer, Quantitative; Future  -     proBNP; Future    3. Primary hypertension  -     ECG 12 Lead  -     Comprehensive Metabolic Panel; Future  -     TSH; Future  -     Lipid Panel; Future  -     CBC & Differential; Future  -     Magnesium; Future  -     Hemoglobin A1c; Future  -     Adult Transthoracic Echo Complete W/ Cont if Necessary Per Protocol; Future  -     POC Occult Blood Stool  -     Urinalysis With Culture If Indicated -; Future  -     D-dimer, Quantitative; Future  -     proBNP; Future    4. Paroxysmal atrial fibrillation  -     ECG 12 Lead  -     Comprehensive Metabolic Panel; Future  -     TSH; Future  -     Lipid Panel; Future  -     CBC & Differential; Future  -     Magnesium; Future  -     Hemoglobin A1c; Future  -     Adult Transthoracic Echo Complete W/ Cont if Necessary Per Protocol; Future  -     POC Occult Blood Stool  -     Urinalysis With Culture If Indicated -; Future  -     D-dimer, Quantitative; Future  -     proBNP; Future    5. Presence of cardiac pacemaker  -     ECG 12 Lead  -     Comprehensive Metabolic Panel; Future  -     TSH; Future  -     Lipid Panel; Future  -     CBC & Differential; Future  -     Magnesium; Future  -     Hemoglobin A1c; Future  -     Adult Transthoracic Echo Complete W/ Cont if Necessary Per Protocol; Future  -     POC Occult Blood Stool  -     Urinalysis With Culture If Indicated -; Future  -     D-dimer, Quantitative; Future  -     proBNP; Future    6. Shortness of breath  -     ECG 12 Lead  -     Comprehensive Metabolic Panel; Future  -     TSH; Future  -     Lipid Panel; Future  -     CBC & Differential; Future  -      Magnesium; Future  -     Hemoglobin A1c; Future  -     Adult Transthoracic Echo Complete W/ Cont if Necessary Per Protocol; Future  -     POC Occult Blood Stool  -     Urinalysis With Culture If Indicated -; Future  -     D-dimer, Quantitative; Future  -     proBNP; Future    7. Nonsustained ventricular tachycardia  -     ECG 12 Lead  -     Comprehensive Metabolic Panel; Future  -     TSH; Future  -     Lipid Panel; Future  -     CBC & Differential; Future  -     Magnesium; Future  -     Hemoglobin A1c; Future  -     Adult Transthoracic Echo Complete W/ Cont if Necessary Per Protocol; Future  -     POC Occult Blood Stool  -     Urinalysis With Culture If Indicated -; Future  -     D-dimer, Quantitative; Future  -     proBNP; Future    8. Claudication  -     Doppler Ankle Brachial Index Single Level CAR; Future  -     Adult Transthoracic Echo Complete W/ Cont if Necessary Per Protocol; Future  -     POC Occult Blood Stool  -     Urinalysis With Culture If Indicated -; Future  -     D-dimer, Quantitative; Future  -     proBNP; Future    9. Leg swelling  -     Doppler Ankle Brachial Index Single Level CAR; Future  -     Adult Transthoracic Echo Complete W/ Cont if Necessary Per Protocol; Future  -     POC Occult Blood Stool  -     Urinalysis With Culture If Indicated -; Future  -     D-dimer, Quantitative; Future  -     proBNP; Future    10. BRBPR (bright red blood per rectum)  -     POC Occult Blood Stool  -     Urinalysis With Culture If Indicated -; Future  -     D-dimer, Quantitative; Future  -     proBNP; Future    11. Hematuria, unspecified type  -     POC Occult Blood Stool  -     Urinalysis With Culture If Indicated -; Future  -     D-dimer, Quantitative; Future  -     proBNP; Future    Other orders  -     metoprolol tartrate (LOPRESSOR) 100 MG tablet; Take 0.25 tablets by mouth 2 (Two) Times a Day.  Dispense: 180 tablet; Refill: 3        Cris Canada  reports that she has never smoked. She does not have  any smokeless tobacco history on file. I have educated her on the risk of diseases from using tobacco products. Patient does not smoke.                          MEDS ORDERED DURING VISIT:  New Medications Ordered This Visit   Medications    metoprolol tartrate (LOPRESSOR) 100 MG tablet     Sig: Take 0.25 tablets by mouth 2 (Two) Times a Day.     Dispense:  180 tablet     Refill:  3           This document has been electronically signed by Adolfo Dumont Jr., APRN  December 9, 2024 13:23 EST

## 2025-01-02 DIAGNOSIS — M79.89 LEG SWELLING: ICD-10-CM

## 2025-01-02 DIAGNOSIS — I71.21 ANEURYSM OF ASCENDING AORTA WITHOUT RUPTURE: ICD-10-CM

## 2025-01-03 RX ORDER — BUMETANIDE 1 MG/1
1 TABLET ORAL DAILY
Qty: 30 TABLET | Refills: 1 | Status: SHIPPED | OUTPATIENT
Start: 2025-01-03

## 2025-01-13 ENCOUNTER — LAB (OUTPATIENT)
Dept: LAB | Facility: HOSPITAL | Age: 60
End: 2025-01-13
Payer: MEDICAID

## 2025-01-13 ENCOUNTER — HOSPITAL ENCOUNTER (OUTPATIENT)
Dept: CARDIOLOGY | Facility: HOSPITAL | Age: 60
Discharge: HOME | End: 2025-01-13
Payer: MEDICAID

## 2025-01-13 ENCOUNTER — TELEPHONE (OUTPATIENT)
Dept: CARDIOLOGY | Facility: CLINIC | Age: 60
End: 2025-01-13
Payer: MEDICAID

## 2025-01-13 DIAGNOSIS — I10 PRIMARY HYPERTENSION: Primary | ICD-10-CM

## 2025-01-13 DIAGNOSIS — I49.5 SICK SINUS SYNDROME: ICD-10-CM

## 2025-01-13 DIAGNOSIS — Z95.0 PRESENCE OF CARDIAC PACEMAKER: ICD-10-CM

## 2025-01-13 DIAGNOSIS — I73.9 CLAUDICATION: ICD-10-CM

## 2025-01-13 DIAGNOSIS — R55 SYNCOPE AND COLLAPSE: ICD-10-CM

## 2025-01-13 DIAGNOSIS — R06.02 SHORTNESS OF BREATH: ICD-10-CM

## 2025-01-13 DIAGNOSIS — K62.5 BRBPR (BRIGHT RED BLOOD PER RECTUM): ICD-10-CM

## 2025-01-13 DIAGNOSIS — I47.29 NONSUSTAINED VENTRICULAR TACHYCARDIA: ICD-10-CM

## 2025-01-13 DIAGNOSIS — M79.89 LEG SWELLING: ICD-10-CM

## 2025-01-13 DIAGNOSIS — I10 PRIMARY HYPERTENSION: ICD-10-CM

## 2025-01-13 DIAGNOSIS — R31.9 HEMATURIA, UNSPECIFIED TYPE: ICD-10-CM

## 2025-01-13 DIAGNOSIS — I71.21 ANEURYSM OF ASCENDING AORTA WITHOUT RUPTURE: ICD-10-CM

## 2025-01-13 DIAGNOSIS — I48.0 PAROXYSMAL ATRIAL FIBRILLATION: ICD-10-CM

## 2025-01-13 LAB
ANION GAP SERPL CALCULATED.3IONS-SCNC: 7.6 MMOL/L (ref 5–15)
AORTIC DIMENSIONLESS INDEX: 0.9 (DI)
AV MEAN PRESS GRAD SYS DOP V1V2: 5 MMHG
AV VMAX SYS DOP: 153 CM/SEC
BH CV ECHO MEAS - ACS: 2.1 CM
BH CV ECHO MEAS - AI P1/2T: 1078 MSEC
BH CV ECHO MEAS - AO MAX PG: 9.4 MMHG
BH CV ECHO MEAS - AO ROOT DIAM: 3.3 CM
BH CV ECHO MEAS - AO V2 VTI: 29 CM
BH CV ECHO MEAS - AVA(I,D): 2.7 CM2
BH CV ECHO MEAS - EDV(CUBED): 115.5 ML
BH CV ECHO MEAS - EDV(MOD-SP4): 144 ML
BH CV ECHO MEAS - EF(MOD-SP4): 58.7 %
BH CV ECHO MEAS - ESV(CUBED): 56.2 ML
BH CV ECHO MEAS - ESV(MOD-SP4): 59.4 ML
BH CV ECHO MEAS - FS: 21.4 %
BH CV ECHO MEAS - IVS/LVPW: 0.9 CM
BH CV ECHO MEAS - IVSD: 0.92 CM
BH CV ECHO MEAS - LA A2CS (ATRIAL LENGTH): 4.9 CM
BH CV ECHO MEAS - LA A4C LENGTH: 5.3 CM
BH CV ECHO MEAS - LA DIMENSION: 3.8 CM
BH CV ECHO MEAS - LAT PEAK E' VEL: 9.4 CM/SEC
BH CV ECHO MEAS - LV DIASTOLIC VOL/BSA (35-75): 69.6 CM2
BH CV ECHO MEAS - LV MASS(C)D: 168.9 GRAMS
BH CV ECHO MEAS - LV MAX PG: 6.7 MMHG
BH CV ECHO MEAS - LV MEAN PG: 3 MMHG
BH CV ECHO MEAS - LV SYSTOLIC VOL/BSA (12-30): 28.7 CM2
BH CV ECHO MEAS - LV V1 MAX: 129 CM/SEC
BH CV ECHO MEAS - LV V1 VTI: 25.3 CM
BH CV ECHO MEAS - LVIDD: 4.9 CM
BH CV ECHO MEAS - LVIDS: 3.8 CM
BH CV ECHO MEAS - LVOT AREA: 3.1 CM2
BH CV ECHO MEAS - LVOT DIAM: 2 CM
BH CV ECHO MEAS - LVPWD: 1.03 CM
BH CV ECHO MEAS - MED PEAK E' VEL: 6.5 CM/SEC
BH CV ECHO MEAS - MV A MAX VEL: 88.4 CM/SEC
BH CV ECHO MEAS - MV DEC TIME: 0.15 SEC
BH CV ECHO MEAS - MV E MAX VEL: 94.4 CM/SEC
BH CV ECHO MEAS - MV E/A: 1.07
BH CV ECHO MEAS - PA V2 MAX: 116 CM/SEC
BH CV ECHO MEAS - RAP SYSTOLE: 10 MMHG
BH CV ECHO MEAS - RV MAX PG: 2.11 MMHG
BH CV ECHO MEAS - RV V1 MAX: 72.7 CM/SEC
BH CV ECHO MEAS - RV V1 VTI: 15.8 CM
BH CV ECHO MEAS - RVDD: 3.8 CM
BH CV ECHO MEAS - RVSP: 34.4 MMHG
BH CV ECHO MEAS - SV(LVOT): 79.5 ML
BH CV ECHO MEAS - SV(MOD-SP4): 84.6 ML
BH CV ECHO MEAS - SVI(LVOT): 38.4 ML/M2
BH CV ECHO MEAS - SVI(MOD-SP4): 40.9 ML/M2
BH CV ECHO MEAS - TR MAX PG: 24.4 MMHG
BH CV ECHO MEAS - TR MAX VEL: 247 CM/SEC
BH CV ECHO MEASUREMENTS AVERAGE E/E' RATIO: 11.87
BILIRUB UR QL STRIP: NEGATIVE
BUN SERPL-MCNC: 8 MG/DL (ref 6–20)
BUN/CREAT SERPL: 11.3 (ref 7–25)
CALCIUM SPEC-SCNC: 9.3 MG/DL (ref 8.6–10.5)
CHLORIDE SERPL-SCNC: 106 MMOL/L (ref 98–107)
CHOLEST SERPL-MCNC: 111 MG/DL (ref 0–200)
CLARITY UR: CLEAR
CO2 SERPL-SCNC: 29.4 MMOL/L (ref 22–29)
COLOR UR: YELLOW
CREAT SERPL-MCNC: 0.71 MG/DL (ref 0.57–1)
EGFRCR SERPLBLD CKD-EPI 2021: 98.1 ML/MIN/1.73
GLUCOSE SERPL-MCNC: 108 MG/DL (ref 65–99)
GLUCOSE UR STRIP-MCNC: NEGATIVE MG/DL
HBA1C MFR BLD: 5.8 % (ref 4.8–5.6)
HDLC SERPL-MCNC: 48 MG/DL (ref 40–60)
HGB UR QL STRIP.AUTO: NEGATIVE
KETONES UR QL STRIP: NEGATIVE
LDLC SERPL CALC-MCNC: 39 MG/DL (ref 0–100)
LDLC/HDLC SERPL: 0.75 {RATIO}
LEFT ATRIUM VOLUME INDEX: 15.3 ML/M2
LEFT ATRIUM VOLUME: 32 ML
LEUKOCYTE ESTERASE UR QL STRIP.AUTO: NEGATIVE
MAGNESIUM SERPL-MCNC: 2 MG/DL (ref 1.6–2.6)
NITRITE UR QL STRIP: NEGATIVE
PH UR STRIP.AUTO: <=5 [PH] (ref 5–8)
POTASSIUM SERPL-SCNC: 4.4 MMOL/L (ref 3.5–5.2)
PROT UR QL STRIP: NEGATIVE
SODIUM SERPL-SCNC: 143 MMOL/L (ref 136–145)
SP GR UR STRIP: 1.01 (ref 1–1.03)
TRIGL SERPL-MCNC: 136 MG/DL (ref 0–150)
UROBILINOGEN UR QL STRIP: NORMAL
VLDLC SERPL-MCNC: 24 MG/DL (ref 5–40)

## 2025-01-13 PROCEDURE — 80061 LIPID PANEL: CPT

## 2025-01-13 PROCEDURE — 36415 COLL VENOUS BLD VENIPUNCTURE: CPT

## 2025-01-13 PROCEDURE — 83735 ASSAY OF MAGNESIUM: CPT

## 2025-01-13 PROCEDURE — 93306 TTE W/DOPPLER COMPLETE: CPT

## 2025-01-13 PROCEDURE — 83036 HEMOGLOBIN GLYCOSYLATED A1C: CPT

## 2025-01-13 PROCEDURE — 81003 URINALYSIS AUTO W/O SCOPE: CPT

## 2025-01-13 PROCEDURE — 80048 BASIC METABOLIC PNL TOTAL CA: CPT

## 2025-01-13 PROCEDURE — 85025 COMPLETE CBC W/AUTO DIFF WBC: CPT | Performed by: NURSE PRACTITIONER

## 2025-01-13 NOTE — TELEPHONE ENCOUNTER
[facility] staff attempted to follow warm transfer process and was unsuccessful     Caller: Cris Canada    Relationship to patient: Self    Best call back number: [phone]     Patient is needing: [facility]UB TRIED TO TRANSFER TO [facility] PER WORKFLOW TO RES HER ECHO FOR TODAY. PT STATES UNABLE TO MAKE APPT AT 1:30PM TODAY. INQUIRED ABOUT RESCHEDULING FOR 4:30PM TODAY. LET THE PT KNOW I WOULD HAVE TO GET HER ON OVER TO [facility] TO SCHEDULE DIAGNOSTIC IMAGING, AS THEIR SCHEDULE IS DIFFERENT BUT I DO BELIEVE THEY CLOSE AT 4PM. UPON TRYING TO GET PT TRANSFERRED TO [facility], PT DISCONNECTED THE CALL. PLEASE CALL AND ADVISE.

## 2025-01-14 NOTE — PROGRESS NOTES
Elevated hemoglobin A1c at 5.8    Send BMP identified elevated glucose of 108 otherwise relatively unremarkable.    Lipid panel identified triglycerides 136, HDL of 48, LDL 39.  All within goal.    Normal magnesium.    Normal UA.      Keep follow-up.

## 2025-01-15 ENCOUNTER — LAB (OUTPATIENT)
Dept: LAB | Facility: HOSPITAL | Age: 60
End: 2025-01-15
Payer: MEDICAID

## 2025-01-15 DIAGNOSIS — R06.02 SHORTNESS OF BREATH: ICD-10-CM

## 2025-01-15 DIAGNOSIS — R55 SYNCOPE AND COLLAPSE: ICD-10-CM

## 2025-01-15 DIAGNOSIS — I10 PRIMARY HYPERTENSION: ICD-10-CM

## 2025-01-15 LAB
ALBUMIN SERPL-MCNC: 3.9 G/DL (ref 3.5–5.2)
ALBUMIN/GLOB SERPL: 1.3 G/DL
ALP SERPL-CCNC: 115 U/L (ref 39–117)
ALT SERPL W P-5'-P-CCNC: 31 U/L (ref 1–33)
ANION GAP SERPL CALCULATED.3IONS-SCNC: 8.4 MMOL/L (ref 5–15)
AST SERPL-CCNC: 25 U/L (ref 1–32)
BILIRUB SERPL-MCNC: 0.5 MG/DL (ref 0–1.2)
BUN SERPL-MCNC: 9 MG/DL (ref 6–20)
BUN/CREAT SERPL: 13 (ref 7–25)
CALCIUM SPEC-SCNC: 9 MG/DL (ref 8.6–10.5)
CHLORIDE SERPL-SCNC: 106 MMOL/L (ref 98–107)
CO2 SERPL-SCNC: 27.6 MMOL/L (ref 22–29)
CREAT SERPL-MCNC: 0.69 MG/DL (ref 0.57–1)
D DIMER PPP FEU-MCNC: <0.27 MCGFEU/ML (ref 0–0.59)
EGFRCR SERPLBLD CKD-EPI 2021: 100.1 ML/MIN/1.73
GLOBULIN UR ELPH-MCNC: 3.1 GM/DL
GLUCOSE SERPL-MCNC: 151 MG/DL (ref 65–99)
NT-PROBNP SERPL-MCNC: 39.7 PG/ML (ref 0–900)
POTASSIUM SERPL-SCNC: 3.6 MMOL/L (ref 3.5–5.2)
PROT SERPL-MCNC: 7 G/DL (ref 6–8.5)
SODIUM SERPL-SCNC: 142 MMOL/L (ref 136–145)
TSH SERPL DL<=0.05 MIU/L-ACNC: 2.18 UIU/ML (ref 0.27–4.2)

## 2025-01-15 PROCEDURE — 83880 ASSAY OF NATRIURETIC PEPTIDE: CPT

## 2025-01-15 PROCEDURE — 85379 FIBRIN DEGRADATION QUANT: CPT

## 2025-01-15 PROCEDURE — 84443 ASSAY THYROID STIM HORMONE: CPT

## 2025-01-15 PROCEDURE — 36415 COLL VENOUS BLD VENIPUNCTURE: CPT

## 2025-01-15 PROCEDURE — 80053 COMPREHEN METABOLIC PANEL: CPT

## 2025-01-16 ENCOUNTER — TELEPHONE (OUTPATIENT)
Dept: CARDIOLOGY | Facility: CLINIC | Age: 60
End: 2025-01-16
Payer: MEDICAID

## 2025-01-16 NOTE — PROGRESS NOTES
Normal D-dimer, TSH, proBNP.  CMP is relatively unremarkable except for elevated glucose at 151.  Keep follow-up.

## 2025-01-16 NOTE — TELEPHONE ENCOUNTER
LABS  Pt notified of no acute findings. Provider will discuss results at f/u. Pt reminded of appt date and time.  ----- Message from Nini CREWS sent at 1/15/2025  4:54 PM EST -----    ----- Message -----  From: Adolfo Dumont APRN  Sent: 1/14/2025   1:52 PM EST  To: Nini Jimenez MA    Unremarkable CBC, keep follow-up.

## 2025-01-20 ENCOUNTER — TELEPHONE (OUTPATIENT)
Dept: CARDIOLOGY | Facility: CLINIC | Age: 60
End: 2025-01-20
Payer: MEDICAID

## 2025-01-20 NOTE — TELEPHONE ENCOUNTER
Relay    I called patient and left a message with lab results as follows:    Elevated hemoglobin A1c at 5.8    Send BMP identified elevated glucose of 108 otherwise relatively unremarkable.    Lipid panel identified triglycerides 136, HDL of 48, LDL 39.  All within goal.    Normal magnesium.    Normal UA.      Keep follow-up.

## 2025-01-20 NOTE — TELEPHONE ENCOUNTER
I called patient and went over lab results as follows:    Normal D-dimer, TSH, proBNP.  CMP is relatively unremarkable except for elevated glucose at 151.  Keep follow-up.

## 2025-01-21 ENCOUNTER — TELEPHONE (OUTPATIENT)
Dept: CARDIOLOGY | Facility: CLINIC | Age: 60
End: 2025-01-21
Payer: MEDICAID

## 2025-01-21 NOTE — TELEPHONE ENCOUNTER
CRISTHIAN   Pt notified of no acute findings. Provider will discuss results at f/u. Pt reminded of appt date and time.  ----- Message from Nini CRESW sent at 1/17/2025 12:39 PM EST -----    ----- Message -----  From: Adolfo Dumont APRN  Sent: 1/16/2025   2:15 PM EST  To: Nini Jimenez MA    No evidence of high-grade significant peripheral vascular disease.  Keep follow-up

## 2025-01-23 LAB
AORTIC DIMENSIONLESS INDEX: 0.9 (DI)
AV MEAN PRESS GRAD SYS DOP V1V2: 5 MMHG
AV VMAX SYS DOP: 153 CM/SEC
BH CV ECHO MEAS - ACS: 2.1 CM
BH CV ECHO MEAS - AI P1/2T: 1078 MSEC
BH CV ECHO MEAS - AO MAX PG: 9.4 MMHG
BH CV ECHO MEAS - AO ROOT DIAM: 3.3 CM
BH CV ECHO MEAS - AO V2 VTI: 29 CM
BH CV ECHO MEAS - AVA(I,D): 2.7 CM2
BH CV ECHO MEAS - EDV(CUBED): 115.5 ML
BH CV ECHO MEAS - EDV(MOD-SP4): 144 ML
BH CV ECHO MEAS - EF(MOD-SP4): 58.7 %
BH CV ECHO MEAS - ESV(CUBED): 56.2 ML
BH CV ECHO MEAS - ESV(MOD-SP4): 59.4 ML
BH CV ECHO MEAS - FS: 21.4 %
BH CV ECHO MEAS - IVS/LVPW: 0.9 CM
BH CV ECHO MEAS - IVSD: 0.92 CM
BH CV ECHO MEAS - LA A2CS (ATRIAL LENGTH): 4.9 CM
BH CV ECHO MEAS - LA A4C LENGTH: 5.3 CM
BH CV ECHO MEAS - LA DIMENSION: 3.8 CM
BH CV ECHO MEAS - LAT PEAK E' VEL: 9.4 CM/SEC
BH CV ECHO MEAS - LV DIASTOLIC VOL/BSA (35-75): 69.6 CM2
BH CV ECHO MEAS - LV MASS(C)D: 168.9 GRAMS
BH CV ECHO MEAS - LV MAX PG: 6.7 MMHG
BH CV ECHO MEAS - LV MEAN PG: 3 MMHG
BH CV ECHO MEAS - LV SYSTOLIC VOL/BSA (12-30): 28.7 CM2
BH CV ECHO MEAS - LV V1 MAX: 129 CM/SEC
BH CV ECHO MEAS - LV V1 VTI: 25.3 CM
BH CV ECHO MEAS - LVIDD: 4.9 CM
BH CV ECHO MEAS - LVIDS: 3.8 CM
BH CV ECHO MEAS - LVOT AREA: 3.1 CM2
BH CV ECHO MEAS - LVOT DIAM: 2 CM
BH CV ECHO MEAS - LVPWD: 1.03 CM
BH CV ECHO MEAS - MED PEAK E' VEL: 6.5 CM/SEC
BH CV ECHO MEAS - MV A MAX VEL: 88.4 CM/SEC
BH CV ECHO MEAS - MV DEC TIME: 0.15 SEC
BH CV ECHO MEAS - MV E MAX VEL: 94.4 CM/SEC
BH CV ECHO MEAS - MV E/A: 1.07
BH CV ECHO MEAS - PA V2 MAX: 116 CM/SEC
BH CV ECHO MEAS - RAP SYSTOLE: 10 MMHG
BH CV ECHO MEAS - RV MAX PG: 2.11 MMHG
BH CV ECHO MEAS - RV V1 MAX: 72.7 CM/SEC
BH CV ECHO MEAS - RV V1 VTI: 15.8 CM
BH CV ECHO MEAS - RVDD: 3.8 CM
BH CV ECHO MEAS - RVSP: 34.4 MMHG
BH CV ECHO MEAS - SV(LVOT): 79.5 ML
BH CV ECHO MEAS - SV(MOD-SP4): 84.6 ML
BH CV ECHO MEAS - SVI(LVOT): 38.4 ML/M2
BH CV ECHO MEAS - SVI(MOD-SP4): 40.9 ML/M2
BH CV ECHO MEAS - TR MAX PG: 24.4 MMHG
BH CV ECHO MEAS - TR MAX VEL: 247 CM/SEC
BH CV ECHO MEASUREMENTS AVERAGE E/E' RATIO: 11.87
LEFT ATRIUM VOLUME INDEX: 15.3 ML/M2
LEFT ATRIUM VOLUME: 32 ML

## 2025-01-29 ENCOUNTER — TELEPHONE (OUTPATIENT)
Dept: CARDIOLOGY | Facility: CLINIC | Age: 60
End: 2025-01-29
Payer: MEDICAID

## 2025-01-29 NOTE — TELEPHONE ENCOUNTER
"RELAY    \"Calling to inform you of no acute findings or abnormalities on echo, please keep next scheduled appointment or call us with any new or worsening symptoms.\"    Routed to PCP   "

## 2025-01-29 NOTE — TELEPHONE ENCOUNTER
----- Message from Lauren PARMAR sent at 1/28/2025  4:57 PM EST -----      ----- Message -----  From: Nini Jimenez MA  Sent: 1/24/2025  12:09 PM EST  To: IVETTE Perez      ----- Message -----  From: Adolfo Dumont APRN  Sent: 1/24/2025  10:35 AM EST  To: Nini Jimenez MA    There is no acute findings on the echocardiogram.  Keep follow-up.

## 2025-01-29 NOTE — TELEPHONE ENCOUNTER
Name: Cris Canada      Relationship: Self      Best Callback Number: [phone]      HUB PROVIDED THE RELAY MESSAGE FROM THE OFFICE      PATIENT: VOICED UNDERSTANDING AND HAS NO FURTHER QUESTIONS AT THIS TIME    ADDITIONAL INFORMATION:

## 2025-03-28 DIAGNOSIS — M79.89 LEG SWELLING: ICD-10-CM

## 2025-03-28 DIAGNOSIS — I71.21 ANEURYSM OF ASCENDING AORTA WITHOUT RUPTURE: ICD-10-CM

## 2025-03-28 RX ORDER — BUMETANIDE 1 MG/1
TABLET ORAL
Qty: 90 TABLET | Refills: 3 | Status: SHIPPED | OUTPATIENT
Start: 2025-03-28

## 2025-05-20 ENCOUNTER — OFFICE VISIT (OUTPATIENT)
Dept: CARDIOLOGY | Facility: CLINIC | Age: 60
End: 2025-05-20
Payer: MEDICAID

## 2025-05-20 VITALS
HEIGHT: 62 IN | HEART RATE: 77 BPM | BODY MASS INDEX: 41.88 KG/M2 | SYSTOLIC BLOOD PRESSURE: 114 MMHG | WEIGHT: 227.6 LBS | DIASTOLIC BLOOD PRESSURE: 79 MMHG | OXYGEN SATURATION: 97 %

## 2025-05-20 DIAGNOSIS — I71.21 ANEURYSM OF ASCENDING AORTA WITHOUT RUPTURE: ICD-10-CM

## 2025-05-20 DIAGNOSIS — R00.2 PALPITATIONS: ICD-10-CM

## 2025-05-20 DIAGNOSIS — Z95.0 PRESENCE OF CARDIAC PACEMAKER: ICD-10-CM

## 2025-05-20 DIAGNOSIS — R06.02 SHORTNESS OF BREATH: ICD-10-CM

## 2025-05-20 DIAGNOSIS — I48.0 PAROXYSMAL ATRIAL FIBRILLATION: Primary | ICD-10-CM

## 2025-05-20 DIAGNOSIS — I73.9 CLAUDICATION: ICD-10-CM

## 2025-05-20 PROCEDURE — 99214 OFFICE O/P EST MOD 30 MIN: CPT | Performed by: NURSE PRACTITIONER

## 2025-05-20 PROCEDURE — 1159F MED LIST DOCD IN RCRD: CPT | Performed by: NURSE PRACTITIONER

## 2025-05-20 PROCEDURE — 1160F RVW MEDS BY RX/DR IN RCRD: CPT | Performed by: NURSE PRACTITIONER

## 2025-05-20 RX ORDER — FLUOXETINE HYDROCHLORIDE 40 MG/1
1 CAPSULE ORAL DAILY
COMMUNITY
Start: 2025-04-25

## 2025-05-20 RX ORDER — ISOSORBIDE MONONITRATE 30 MG/1
30 TABLET, EXTENDED RELEASE ORAL DAILY
Qty: 30 TABLET | Refills: 11 | Status: SHIPPED | OUTPATIENT
Start: 2025-05-20

## 2025-05-20 NOTE — PROGRESS NOTES
Subjective     Cris Canada is a 59 y.o. female who presents to day for follow up test results  (CRISTHIAN & Echo results).    CHIEF COMPLIANT  Chief Complaint   Patient presents with    follow up test results      CRISTHIAN & Echo results       Active Problems:  Problem List Items Addressed This Visit    None  Visit Diagnoses         Paroxysmal atrial fibrillation    -  Primary    Relevant Medications    isosorbide mononitrate (IMDUR) 30 MG 24 hr tablet    Other Relevant Orders    Basic Metabolic Panel      Aneurysm of ascending aorta without rupture        Relevant Orders    CT Angiogram Chest    Basic Metabolic Panel      Presence of cardiac pacemaker        Relevant Orders    Basic Metabolic Panel      Claudication        Relevant Orders    Basic Metabolic Panel      Shortness of breath        Relevant Orders    Basic Metabolic Panel      Palpitations        Relevant Orders    Basic Metabolic Panel        Problem list:  1.  Chest pain  1.1 left heart cath 1/22: Left main normal, LAD normal, circumflex normal, RCA normal  1.2 stress test 10/23: Negative stress, post-rest EF 72%  2.  Palpitations  3.  Atrial fibrillation anticoagulated with Eliquis  4.  Lower extremity edema  5.  Chronic arterial hypertension  5.1 echocardiogram 1/25: EF greater than 50%, grade 1A diastolic dysfunction, no hemodynamically significant valvular disease  6.  CTA June 24: Ascending aortic aneurysm 4.1 cm  7.  Claudication  7.1 ABIs 1/25: No indications of high-grade peripheral vascular disease    HPI  HPI  Ms. Cris Canada is a 59-year-old female patient who is being followed up today for atrial fibrillation and chronic arterial hypertension.     Patient does have a history of atrial fibrillation which she is on Eliquis for anticoagulation and rate control of metoprolol.  She denies any bleeding on the Eliquis.    Patient also has chronic arterial hypertension in which she is treated with metoprolol.  Today her blood pressure is 114/79 and  heart rate of 77.    Patient does have a history of ascending aortic aNeurysm ascending aortic aneurysm of 4.1 cm per CTA in June 2024 however there is mild splenomegaly noted.    Due to claudication-like symptoms patient also had ABIs which indicated no significant high-grade peripheral vascular disease.  Patient also had an echocardiogram that showed an ejection fraction greater than 50%, grade 1A diastolic dysfunction, and no hemodynamically significant valvular disease.  We did discuss these in detail.    Patient does report shortness of breath occurs intermittently.  Says it does not always happen when she is active but she does get short of breath with activity as well as at rest.  She also has some level of orthopnea where she cannot lay flat on her back.    Patient does report palpitation which is intermittent fluttering like sensation that occurs in her chest but is usually short-lived.  May happen 1-3 times a week.    7 patient overall says she does not feel bad she does pretty good from the cardiovascular standpoint.  She denies any significant angina anginal equivalent symptoms.  Patient denies any chest pain,  syncope,  or strokelike symptoms.      PRIOR MEDS         Current Outpatient Medications on File Prior to Visit   Medication Sig Dispense Refill    ALBUTEROL IN Inhale 2.5 mg As Needed. Neb soultion      albuterol sulfate  (90 Base) MCG/ACT inhaler Inhale 2 puffs Every 4 (Four) Hours As Needed for Wheezing.      amLODIPine (NORVASC) 2.5 MG tablet Take 1 tablet by mouth Daily.      apixaban (ELIQUIS) 5 MG tablet tablet Take 1 tablet by mouth 2 (Two) Times a Day.      budesonide (RINOCORT AQUA) 32 MCG/ACT nasal spray Administer 1 spray into the nostril(s) as directed by provider Daily.      bumetanide (BUMEX) 1 MG tablet TAKE 1 Tablet DAILY AT 7AM 90 tablet 3    FLUoxetine (PROzac) 40 MG capsule Take 1 capsule by mouth Daily.      metoprolol tartrate (LOPRESSOR) 100 MG tablet Take 0.25  tablets by mouth 2 (Two) Times a Day. 180 tablet 3    montelukast (SINGULAIR) 10 MG tablet Take 1 tablet by mouth Every Night.      multivitamin with minerals tablet tablet Take 1 tablet by mouth Daily.      potassium chloride 10 MEQ CR tablet Take 1 tablet by mouth Daily.      sodium chloride 0.65 % nasal spray Administer 1 spray into the nostril(s) as directed by provider As Needed for Congestion.      traZODone (DESYREL) 100 MG tablet Take 1 tablet by mouth Every Night.      venlafaxine (EFFEXOR) 37.5 MG tablet Take 1 tablet by mouth Daily.      venlafaxine (EFFEXOR) 75 MG tablet Take 1 tablet by mouth every night at bedtime.      vitamin D3 125 MCG (5000 UT) capsule capsule Take 1 capsule by mouth Daily.      [DISCONTINUED] isosorbide mononitrate (IMDUR) 30 MG 24 hr tablet Take 1 tablet by mouth Daily. 30 tablet 11     No current facility-administered medications on file prior to visit.       ALLERGIES  Fish-derived products, Adhesive tape, Latex, and Penicillins    HISTORY  Past Medical History:   Diagnosis Date    A-fib     Anxiety     Aortic aneurysm     COPD (chronic obstructive pulmonary disease)     Alpha Tripsen I    Depression     Hypertension     PTSD (post-traumatic stress disorder)        Social History     Socioeconomic History    Marital status: Single   Tobacco Use    Smoking status: Never   Vaping Use    Vaping status: Never Used   Substance and Sexual Activity    Alcohol use: Not Currently     Comment: OCC    Drug use: No    Sexual activity: Defer     Partners: Male       Family History   Problem Relation Age of Onset    Cancer Mother     Heart attack Father     Heart disease Father     Depression Father     Heart failure Sister     Leukemia Sister     Heart disease Brother        Review of Systems   Constitutional:  Negative for chills, fatigue and fever.   HENT:  Negative for congestion, rhinorrhea and sore throat.    Eyes:  Negative for visual disturbance.   Respiratory:  Positive for apnea  "(CPAP is on order) and shortness of breath (with rest or activity comes and goes). Negative for chest tightness.    Cardiovascular:  Positive for palpitations (flutters every now and then) and leg swelling (feet). Negative for chest pain.   Gastrointestinal:  Positive for diarrhea. Negative for constipation and nausea.   Musculoskeletal:  Positive for neck pain. Negative for arthralgias and back pain.   Skin:  Negative for rash and wound.   Allergic/Immunologic: Positive for environmental allergies and food allergies (fish derived  / Pineapples).   Neurological:  Positive for dizziness (room spins), weakness and light-headedness. Negative for syncope.   Hematological:  Bruises/bleeds easily.   Psychiatric/Behavioral:  Positive for sleep disturbance (SOB at night uses O2 @ 2 liters).        Objective     VITALS: /79 (BP Location: Left arm, Patient Position: Sitting, Cuff Size: Adult)   Pulse 77   Ht 157.5 cm (62.01\")   Wt 103 kg (227 lb 9.6 oz)   SpO2 97%   BMI 41.62 kg/m²     LABS:   Lab Results (most recent)       None            IMAGING:   No Images in the past 120 days found..    EXAM:  Physical Exam  Vitals and nursing note reviewed.   Constitutional:       Appearance: She is well-developed.   HENT:      Head: Normocephalic.   Neck:      Thyroid: No thyroid mass.      Vascular: No carotid bruit or JVD.      Trachea: Trachea and phonation normal.   Cardiovascular:      Rate and Rhythm: Normal rate and regular rhythm.      Pulses:           Radial pulses are 2+ on the right side and 2+ on the left side.        Posterior tibial pulses are 2+ on the right side and 2+ on the left side.      Heart sounds: Normal heart sounds. No murmur heard.     No friction rub. No gallop.   Pulmonary:      Effort: Pulmonary effort is normal. No respiratory distress.      Breath sounds: Normal breath sounds. No wheezing or rales.   Musculoskeletal:         General: Swelling (1+) present. Normal range of motion.      " Cervical back: Neck supple.   Skin:     General: Skin is warm and dry.      Capillary Refill: Capillary refill takes less than 2 seconds.      Findings: No rash.   Neurological:      Mental Status: She is alert and oriented to person, place, and time.   Psychiatric:         Speech: Speech normal.         Behavior: Behavior normal.         Thought Content: Thought content normal.         Judgment: Judgment normal.         Procedure   Procedures       Assessment & Plan    Diagnosis Plan   1. Paroxysmal atrial fibrillation  Basic Metabolic Panel      2. Aneurysm of ascending aorta without rupture  CT Angiogram Chest    Basic Metabolic Panel      3. Presence of cardiac pacemaker  Basic Metabolic Panel      4. Claudication  Basic Metabolic Panel      5. Shortness of breath  Basic Metabolic Panel      6. Palpitations  Basic Metabolic Panel      1.  Patient does have paroxysmal atrial fibrillation which seems to be relatively well-controlled.  Less than 1% burden on her most recent device interrogation.  She is tolerating Eliquis without any signs or symptoms of bleeding.  Will continue her current medication treatment without change.  2.  Will order a CTA of her chest to reevaluate her aneurysm which were monitoring on an annual basis.  Will get this in July.  3.  Patient did report claudication-like symptoms in which she has gotten new shoes and seems to be doing better.  She had negative ABIs.  4.  Informed of signs and symptoms of ACS and advised to seek emergent treatment for any new worsening symptoms.  Patient also advised sooner follow-up as needed.  Also advised to follow-up with family doctor as needed  This note is dictated utilizing voice recognition software.  Although this record has been proof read, transcriptional errors may still be present. If questions occur regarding the content of this record please do not hesitate to call our office.  I have reviewed and confirmed the accuracy of the ROS as documented  by the MA/LPN/RN Adolfo Dumont, APRN    Return in about 6 months (around 11/20/2025), or if symptoms worsen or fail to improve.    Diagnoses and all orders for this visit:    1. Paroxysmal atrial fibrillation (Primary)  -     Basic Metabolic Panel; Future    2. Aneurysm of ascending aorta without rupture  -     CT Angiogram Chest; Future  -     Basic Metabolic Panel; Future    3. Presence of cardiac pacemaker  -     Basic Metabolic Panel; Future    4. Claudication  -     Basic Metabolic Panel; Future    5. Shortness of breath  -     Basic Metabolic Panel; Future    6. Palpitations  -     Basic Metabolic Panel; Future    Other orders  -     isosorbide mononitrate (IMDUR) 30 MG 24 hr tablet; Take 1 tablet by mouth Daily.  Dispense: 30 tablet; Refill: 11        Cris Canada  reports that she has never smoked. She does not have any smokeless tobacco history on file. I have educated her on the risk of diseases from using tobacco products. Patient does not smoke.          Class 3 Severe Obesity (BMI >=40). Obesity-related health conditions include the following: obstructive sleep apnea HTN.  We discussed portion control and increasing exercise.           MEDS ORDERED DURING VISIT:  New Medications Ordered This Visit   Medications    isosorbide mononitrate (IMDUR) 30 MG 24 hr tablet     Sig: Take 1 tablet by mouth Daily.     Dispense:  30 tablet     Refill:  11     Please discontinue Isosorbide 10 mg .           This document has been electronically signed by DURAN Martin Jr.  May 20, 2025 12:25 EDT

## 2025-06-27 LAB
MC_CV_MDC_IDC_RATE_1: 160
MC_CV_MDC_IDC_ZONE_ID: 1
MDC_IDC_MSMT_BATTERY_REMAINING_LONGEVITY: 84 MO
MDC_IDC_MSMT_BATTERY_REMAINING_PERCENTAGE: 100 %
MDC_IDC_MSMT_BATTERY_STATUS: NORMAL
MDC_IDC_MSMT_LEADCHNL_RA_DTM: NORMAL
MDC_IDC_MSMT_LEADCHNL_RA_IMPEDANCE_VALUE: 578
MDC_IDC_MSMT_LEADCHNL_RA_PACING_THRESHOLD_AMPLITUDE: 0.4
MDC_IDC_MSMT_LEADCHNL_RA_PACING_THRESHOLD_POLARITY: NORMAL
MDC_IDC_MSMT_LEADCHNL_RA_PACING_THRESHOLD_PULSEWIDTH: 0.4
MDC_IDC_MSMT_LEADCHNL_RA_SENSING_INTR_AMPL: 1
MDC_IDC_MSMT_LEADCHNL_RV_DTM: NORMAL
MDC_IDC_MSMT_LEADCHNL_RV_IMPEDANCE_VALUE: 438
MDC_IDC_MSMT_LEADCHNL_RV_PACING_THRESHOLD_AMPLITUDE: 0.9
MDC_IDC_MSMT_LEADCHNL_RV_PACING_THRESHOLD_POLARITY: NORMAL
MDC_IDC_MSMT_LEADCHNL_RV_PACING_THRESHOLD_PULSEWIDTH: 0.4
MDC_IDC_MSMT_LEADCHNL_RV_SENSING_INTR_AMPL: 5.2
MDC_IDC_PG_IMPLANT_DTM: NORMAL
MDC_IDC_PG_MFG: NORMAL
MDC_IDC_PG_MODEL: NORMAL
MDC_IDC_PG_SERIAL: NORMAL
MDC_IDC_PG_TYPE: NORMAL
MDC_IDC_SESS_DTM: NORMAL
MDC_IDC_SESS_TYPE: NORMAL
MDC_IDC_SET_BRADY_AT_MODE_SWITCH_RATE: 170
MDC_IDC_SET_BRADY_LOWRATE: 60
MDC_IDC_SET_BRADY_MAX_SENSOR_RATE: 130
MDC_IDC_SET_BRADY_MAX_TRACKING_RATE: 130
MDC_IDC_SET_BRADY_MODE: NORMAL
MDC_IDC_SET_BRADY_PAV_DELAY: 200
MDC_IDC_SET_BRADY_SAV_DELAY: 185
MDC_IDC_SET_LEADCHNL_RA_PACING_AMPLITUDE: 2
MDC_IDC_SET_LEADCHNL_RA_PACING_POLARITY: NORMAL
MDC_IDC_SET_LEADCHNL_RA_PACING_PULSEWIDTH: 0.4
MDC_IDC_SET_LEADCHNL_RA_SENSING_POLARITY: NORMAL
MDC_IDC_SET_LEADCHNL_RA_SENSING_SENSITIVITY: 0.25
MDC_IDC_SET_LEADCHNL_RV_PACING_AMPLITUDE: 1.5
MDC_IDC_SET_LEADCHNL_RV_PACING_POLARITY: NORMAL
MDC_IDC_SET_LEADCHNL_RV_PACING_PULSEWIDTH: 0.4
MDC_IDC_SET_LEADCHNL_RV_SENSING_POLARITY: NORMAL
MDC_IDC_SET_LEADCHNL_RV_SENSING_SENSITIVITY: 0.6
MDC_IDC_SET_ZONE_STATUS: NORMAL
MDC_IDC_SET_ZONE_TYPE: NORMAL
MDC_IDC_STAT_AT_BURDEN_PERCENT: 1
MDC_IDC_STAT_BRADY_RA_PERCENT_PACED: 21
MDC_IDC_STAT_BRADY_RV_PERCENT_PACED: 0

## 2025-07-14 ENCOUNTER — RESULTS FOLLOW-UP (OUTPATIENT)
Dept: CARDIOLOGY | Facility: CLINIC | Age: 60
End: 2025-07-14
Payer: MEDICAID

## 2025-07-14 NOTE — TELEPHONE ENCOUNTER
Adolfo Dumont, APRN to Nini Jimenez MA      7/14/25  1:14 PM  Note      Stable 4 cm ascending aortic aneurysm.  Keep follow-up         CT Angiogram Chest

## 2025-07-22 ENCOUNTER — TELEPHONE (OUTPATIENT)
Dept: CARDIOLOGY | Facility: CLINIC | Age: 60
End: 2025-07-22
Payer: MEDICAID

## 2025-07-22 NOTE — TELEPHONE ENCOUNTER
Caller: Cris Canada    Relationship: Self    Best call back number: [phone]     What form or medical record are you requesting: LETTER     Who is requesting this form or medical record from you: PT     How would you like to receive the form or medical records (pick-up, mail, fax):   PT WILL BE COMING TO PICK IT UP. PLEASE CALL PT WHEN LETTER IS READY.       Timeframe paperwork needed: ASAP    Additional notes: PT IS NEEDING LETTER FAXED TO [facility] TO STATE PT CAN NOT SIT THROUGH [facility] THIS FRIDAY 7/25/25

## 2025-07-23 NOTE — TELEPHONE ENCOUNTER
Due to no cardiac issues restricting the pt, our office cannot accommodate for this request.  Notified pt.